# Patient Record
Sex: FEMALE | Race: WHITE | NOT HISPANIC OR LATINO | Employment: OTHER | URBAN - METROPOLITAN AREA
[De-identification: names, ages, dates, MRNs, and addresses within clinical notes are randomized per-mention and may not be internally consistent; named-entity substitution may affect disease eponyms.]

---

## 2023-09-13 LAB
ALANINE AMINOTRANSFERASE (SGPT) (U/L) IN SER/PLAS: 63 U/L (ref 7–45)
ALBUMIN (G/DL) IN SER/PLAS: 4.6 G/DL (ref 3.4–5)
ALKALINE PHOSPHATASE (U/L) IN SER/PLAS: 39 U/L (ref 33–136)
ANION GAP IN SER/PLAS: 15 MMOL/L (ref 10–20)
ASPARTATE AMINOTRANSFERASE (SGOT) (U/L) IN SER/PLAS: 39 U/L (ref 9–39)
BASOPHILS (10*3/UL) IN BLOOD BY AUTOMATED COUNT: 0.05 X10E9/L (ref 0–0.1)
BASOPHILS/100 LEUKOCYTES IN BLOOD BY AUTOMATED COUNT: 0.9 % (ref 0–2)
BILIRUBIN TOTAL (MG/DL) IN SER/PLAS: 0.3 MG/DL (ref 0–1.2)
CALCIUM (MG/DL) IN SER/PLAS: 10.5 MG/DL (ref 8.6–10.6)
CARBON DIOXIDE, TOTAL (MMOL/L) IN SER/PLAS: 26 MMOL/L (ref 21–32)
CHLORIDE (MMOL/L) IN SER/PLAS: 102 MMOL/L (ref 98–107)
COBALAMIN (VITAMIN B12) (PG/ML) IN SER/PLAS: 312 PG/ML (ref 211–911)
CREATININE (MG/DL) IN SER/PLAS: 0.7 MG/DL (ref 0.5–1.05)
EOSINOPHILS (10*3/UL) IN BLOOD BY AUTOMATED COUNT: 0.15 X10E9/L (ref 0–0.7)
EOSINOPHILS/100 LEUKOCYTES IN BLOOD BY AUTOMATED COUNT: 2.7 % (ref 0–6)
ERYTHROCYTE DISTRIBUTION WIDTH (RATIO) BY AUTOMATED COUNT: 13.4 % (ref 11.5–14.5)
ERYTHROCYTE MEAN CORPUSCULAR HEMOGLOBIN CONCENTRATION (G/DL) BY AUTOMATED: 31.6 G/DL (ref 32–36)
ERYTHROCYTE MEAN CORPUSCULAR VOLUME (FL) BY AUTOMATED COUNT: 88 FL (ref 80–100)
ERYTHROCYTES (10*6/UL) IN BLOOD BY AUTOMATED COUNT: 4.87 X10E12/L (ref 4–5.2)
FERRITIN (UG/LL) IN SER/PLAS: 31 UG/L (ref 8–150)
GFR FEMALE: >90 ML/MIN/1.73M2
GLUCOSE (MG/DL) IN SER/PLAS: 95 MG/DL (ref 74–99)
HEMATOCRIT (%) IN BLOOD BY AUTOMATED COUNT: 42.7 % (ref 36–46)
HEMOGLOBIN (G/DL) IN BLOOD: 13.5 G/DL (ref 12–16)
IMMATURE GRANULOCYTES/100 LEUKOCYTES IN BLOOD BY AUTOMATED COUNT: 0.7 % (ref 0–0.9)
IRON (UG/DL) IN SER/PLAS: 89 UG/DL (ref 35–150)
IRON BINDING CAPACITY (UG/DL) IN SER/PLAS: >539 UG/DL (ref 240–445)
IRON SATURATION (%) IN SER/PLAS: ABNORMAL % (ref 25–45)
LEUKOCYTES (10*3/UL) IN BLOOD BY AUTOMATED COUNT: 5.6 X10E9/L (ref 4.4–11.3)
LYMPHOCYTES (10*3/UL) IN BLOOD BY AUTOMATED COUNT: 1.67 X10E9/L (ref 1.2–4.8)
LYMPHOCYTES/100 LEUKOCYTES IN BLOOD BY AUTOMATED COUNT: 29.8 % (ref 13–44)
MONOCYTES (10*3/UL) IN BLOOD BY AUTOMATED COUNT: 0.5 X10E9/L (ref 0.1–1)
MONOCYTES/100 LEUKOCYTES IN BLOOD BY AUTOMATED COUNT: 8.9 % (ref 2–10)
NEUTROPHILS (10*3/UL) IN BLOOD BY AUTOMATED COUNT: 3.2 X10E9/L (ref 1.2–7.7)
NEUTROPHILS/100 LEUKOCYTES IN BLOOD BY AUTOMATED COUNT: 57 % (ref 40–80)
NRBC (PER 100 WBCS) BY AUTOMATED COUNT: 0 /100 WBC (ref 0–0)
PLATELETS (10*3/UL) IN BLOOD AUTOMATED COUNT: 353 X10E9/L (ref 150–450)
POTASSIUM (MMOL/L) IN SER/PLAS: 4 MMOL/L (ref 3.5–5.3)
PROTEIN TOTAL: 7.3 G/DL (ref 6.4–8.2)
SODIUM (MMOL/L) IN SER/PLAS: 139 MMOL/L (ref 136–145)
UREA NITROGEN (MG/DL) IN SER/PLAS: 18 MG/DL (ref 6–23)

## 2023-09-22 LAB — CHROMOGRANIN A, LC/MS/MS: 1867 NG/ML

## 2023-10-09 ENCOUNTER — TELEPHONE (OUTPATIENT)
Dept: HEMATOLOGY/ONCOLOGY | Facility: CLINIC | Age: 63
End: 2023-10-09
Payer: COMMERCIAL

## 2023-10-09 DIAGNOSIS — D3A.092: Primary | ICD-10-CM

## 2023-10-09 PROCEDURE — 83497 ASSAY OF 5-HIAA: CPT

## 2023-10-09 NOTE — TELEPHONE ENCOUNTER
Call returned to Earline at [facility] lab.  Earline received a 24 hour urine sample but no lab requisition.  Asking for a new order placed for the 5-HIAA and FAX the order to her directly at [phone].    Informed Earline that provider is in tomorrow in clinic and will be here to assist with entering this 24 hour urine 5-HIAA.  Earline reports that the collection date of urine was on 10/4/23 and the stability is x 14 days as long as it is kept refrigerated.  Will have provider submit order on 10/10/23.  Message also forwarded to nurse partner Simón Bermudez.       (Previous message retrieved from [facility] written from [employer] on 9/26/23):  Message:    Call returned to patient.  Labs reviewed by phone.  Patient notified of need for 24 hour urine HIAA.  Patient states she has done in the past.  Teaching reiterated.  Call back instructions reviewed.     Patient verbalize understanding.   PI on HIAA included with lab orders.  Patient will  Monday.      Team Communication:  Team Communications:    [employer] Message  Authored By: Nathan Hernandez  Task Type: General Message  Task Importance: Normal  Patient Name: SAMIRA LUX  Date Created: 26-Sep-2023  Please do 24-hour urine 5 HIAA level

## 2023-10-09 NOTE — TELEPHONE ENCOUNTER
Earline from  lab in [facility] called because a 24 hour urine sample was sent to them however they do not have an order from Dr. Hernandez. They need an order sent and if it is sent soon they can still use this sample. If they don't get an order soon they will not be able to use the sample. Please call Earline at [phone]

## 2023-10-09 NOTE — TELEPHONE ENCOUNTER
Order for 5-HIAA was still available in [employer] to be able to print.  Order successfully faxed to [phone].

## 2023-10-09 NOTE — TELEPHONE ENCOUNTER
Spoke to Earline to confirm she received the order from [employer].  Earline stated they cannot use the previous 5-HIAA order from [employer] it needs to be entered as a new order in Epic.  Order forwarded to nurse partner Simón and Dr. Hernandez as  Will be in clinic on 10/10/23.

## 2023-10-10 ENCOUNTER — LAB REQUISITION (OUTPATIENT)
Dept: LAB | Facility: HOSPITAL | Age: 63
End: 2023-10-10
Payer: COMMERCIAL

## 2023-10-10 DIAGNOSIS — D3A.092: ICD-10-CM

## 2023-10-10 NOTE — TELEPHONE ENCOUNTER
Dr. Hernandez placed new order in [facility] for 24 hour Urine for 5HIAA.  Successful fax received to Earline at fax # [phone].  Phone call also made to Earline and she has received the order.

## 2023-10-13 LAB
5OH-INDOLEACETATE 24H UR-MCNC: 4.2 MG/L
5OH-INDOLEACETATE 24H UR-MRATE: 6.7 MG/24 HR (ref 0–14.9)

## 2023-10-16 ENCOUNTER — TELEPHONE (OUTPATIENT)
Dept: HEMATOLOGY/ONCOLOGY | Facility: HOSPITAL | Age: 63
End: 2023-10-16
Payer: COMMERCIAL

## 2023-10-16 NOTE — TELEPHONE ENCOUNTER
Patient was in to see Dr. Hernandez a couple of weeks ago.    She is looking for results on Chromogranin A and 24 hour urine.    Please advise.

## 2023-10-17 ENCOUNTER — TELEPHONE (OUTPATIENT)
Dept: HEMATOLOGY/ONCOLOGY | Facility: CLINIC | Age: 63
End: 2023-10-17
Payer: COMMERCIAL

## 2023-10-17 NOTE — TELEPHONE ENCOUNTER
Spoke with patient regarding lab results. Per MD results were normal/stable and we will continue to monitor.   She is taking her iron pills and has req to have labs drawn in December

## 2024-01-02 ENCOUNTER — LAB (OUTPATIENT)
Dept: LAB | Facility: CLINIC | Age: 64
End: 2024-01-02
Payer: COMMERCIAL

## 2024-01-02 DIAGNOSIS — D3A.8 OTHER BENIGN NEUROENDOCRINE TUMORS (CMS-HCC): Primary | ICD-10-CM

## 2024-01-02 LAB
BASOPHILS # BLD AUTO: 0.04 X10*3/UL (ref 0–0.1)
BASOPHILS NFR BLD AUTO: 0.8 %
EOSINOPHIL # BLD AUTO: 0.14 X10*3/UL (ref 0–0.7)
EOSINOPHIL NFR BLD AUTO: 2.9 %
ERYTHROCYTE [DISTWIDTH] IN BLOOD BY AUTOMATED COUNT: 13.2 % (ref 11.5–14.5)
HCT VFR BLD AUTO: 40.6 % (ref 36–46)
HGB BLD-MCNC: 13 G/DL (ref 12–16)
IMM GRANULOCYTES # BLD AUTO: 0.05 X10*3/UL (ref 0–0.7)
IMM GRANULOCYTES NFR BLD AUTO: 1 % (ref 0–0.9)
LYMPHOCYTES # BLD AUTO: 1.64 X10*3/UL (ref 1.2–4.8)
LYMPHOCYTES NFR BLD AUTO: 34.2 %
MCH RBC QN AUTO: 28.3 PG (ref 26–34)
MCHC RBC AUTO-ENTMCNC: 32 G/DL (ref 32–36)
MCV RBC AUTO: 88 FL (ref 80–100)
MONOCYTES # BLD AUTO: 0.41 X10*3/UL (ref 0.1–1)
MONOCYTES NFR BLD AUTO: 8.5 %
NEUTROPHILS # BLD AUTO: 2.52 X10*3/UL (ref 1.2–7.7)
NEUTROPHILS NFR BLD AUTO: 52.6 %
NRBC BLD-RTO: ABNORMAL /100{WBCS}
PLATELET # BLD AUTO: 379 X10*3/UL (ref 150–450)
RBC # BLD AUTO: 4.6 X10*6/UL (ref 4–5.2)
WBC # BLD AUTO: 4.8 X10*3/UL (ref 4.4–11.3)

## 2024-01-02 PROCEDURE — 85025 COMPLETE CBC W/AUTO DIFF WBC: CPT

## 2024-01-02 PROCEDURE — 82728 ASSAY OF FERRITIN: CPT

## 2024-01-02 PROCEDURE — 83540 ASSAY OF IRON: CPT

## 2024-01-02 PROCEDURE — 36415 COLL VENOUS BLD VENIPUNCTURE: CPT

## 2024-01-03 LAB
FERRITIN SERPL-MCNC: 50 NG/ML (ref 8–150)
IRON SATN MFR SERPL: ABNORMAL %
IRON SERPL-MCNC: 69 UG/DL (ref 35–150)
TIBC SERPL-MCNC: ABNORMAL UG/DL
UIBC SERPL-MCNC: >450 UG/DL (ref 110–370)

## 2024-01-30 ENCOUNTER — TELEPHONE (OUTPATIENT)
Dept: HEMATOLOGY/ONCOLOGY | Facility: CLINIC | Age: 64
End: 2024-01-30
Payer: COMMERCIAL

## 2024-01-30 NOTE — TELEPHONE ENCOUNTER
Patient had labs in January and hasn't heard anything.  Also needs to know if she should continue iron.    Please advise.  If she does not answer, it is ok to leave a message.

## 2024-03-12 ENCOUNTER — APPOINTMENT (OUTPATIENT)
Dept: HEMATOLOGY/ONCOLOGY | Facility: CLINIC | Age: 64
End: 2024-03-12
Payer: COMMERCIAL

## 2024-03-12 PROBLEM — E11.65 POORLY CONTROLLED TYPE 2 DIABETES MELLITUS (MULTI): Status: ACTIVE | Noted: 2022-09-28

## 2024-03-12 PROBLEM — E03.9 ACQUIRED HYPOTHYROIDISM: Status: ACTIVE | Noted: 2022-09-28

## 2024-03-12 PROBLEM — K80.20 GALLSTONE: Status: ACTIVE | Noted: 2023-05-18

## 2024-03-12 PROBLEM — K21.9 GASTRIC REFLUX: Status: ACTIVE | Noted: 2024-03-12

## 2024-03-12 PROBLEM — K29.40 ATROPHIC GASTRITIS: Status: ACTIVE | Noted: 2024-03-12

## 2024-03-12 PROBLEM — E07.9 THYROID DISEASE: Status: ACTIVE | Noted: 2024-03-12

## 2024-03-12 PROBLEM — K31.A0 INTESTINAL METAPLASIA OF GASTRIC MUCOSA: Status: ACTIVE | Noted: 2024-03-12

## 2024-03-12 PROBLEM — D50.9 IDA (IRON DEFICIENCY ANEMIA): Status: ACTIVE | Noted: 2024-03-12

## 2024-03-12 PROBLEM — E78.5 HYPERLIPIDEMIA: Status: ACTIVE | Noted: 2024-03-12

## 2024-03-12 PROBLEM — F41.9 MILD ANXIETY: Status: ACTIVE | Noted: 2024-03-12

## 2024-03-12 PROBLEM — D3A.092 CARCINOID TUMOR OF STOMACH (CMS-HCC): Status: ACTIVE | Noted: 2018-10-29

## 2024-03-12 PROBLEM — D3A.8 NEUROENDOCRINE TUMOR (CMS-HCC): Status: ACTIVE | Noted: 2024-03-12

## 2024-03-12 RX ORDER — GLIPIZIDE 5 MG/1
1 TABLET ORAL 2 TIMES DAILY
COMMUNITY
Start: 2018-10-09

## 2024-03-12 RX ORDER — METFORMIN HYDROCHLORIDE 500 MG/1
TABLET ORAL
COMMUNITY

## 2024-03-12 RX ORDER — LEVOTHYROXINE SODIUM 50 UG/1
TABLET ORAL
COMMUNITY
Start: 2018-06-28

## 2024-03-12 RX ORDER — ASPIRIN 81 MG/1
TABLET ORAL
COMMUNITY
Start: 2020-12-10

## 2024-03-12 RX ORDER — ONDANSETRON 4 MG/1
TABLET, ORALLY DISINTEGRATING ORAL
COMMUNITY
Start: 2023-03-26

## 2024-03-12 RX ORDER — PIOGLITAZONEHYDROCHLORIDE 15 MG/1
15 TABLET ORAL
COMMUNITY
Start: 2023-03-31

## 2024-03-12 RX ORDER — LEVOTHYROXINE SODIUM 50 UG/1
50 CAPSULE ORAL
COMMUNITY
Start: 2018-10-09

## 2024-03-12 RX ORDER — CYANOCOBALAMIN 1000 UG/ML
INJECTION, SOLUTION INTRAMUSCULAR; SUBCUTANEOUS
COMMUNITY
Start: 2018-10-09

## 2024-03-12 RX ORDER — GLIPIZIDE 10 MG/1
TABLET ORAL
COMMUNITY
Start: 2019-04-04

## 2024-03-12 RX ORDER — HYDROCORTISONE AND ACETIC ACID 20.75; 10.375 MG/ML; MG/ML
SOLUTION AURICULAR (OTIC)
COMMUNITY
Start: 2017-09-21

## 2024-03-12 RX ORDER — LOSARTAN POTASSIUM AND HYDROCHLOROTHIAZIDE 25; 100 MG/1; MG/1
TABLET ORAL
COMMUNITY
Start: 2018-10-09

## 2024-03-12 RX ORDER — FENOFIBRATE 134 MG/1
CAPSULE ORAL
COMMUNITY
Start: 2015-04-03

## 2024-03-12 RX ORDER — NITROFURANTOIN 25; 75 MG/1; MG/1
CAPSULE ORAL
COMMUNITY
Start: 2024-01-18

## 2024-03-12 RX ORDER — FLUTICASONE PROPIONATE 50 MCG
SPRAY, SUSPENSION (ML) NASAL
COMMUNITY
Start: 2021-04-05

## 2024-03-12 RX ORDER — FLUCONAZOLE 200 MG/1
TABLET ORAL
COMMUNITY
Start: 2023-12-28

## 2024-03-12 RX ORDER — SIMVASTATIN 5 MG/1
1 TABLET, FILM COATED ORAL DAILY
COMMUNITY

## 2024-03-12 RX ORDER — AMLODIPINE BESYLATE 2.5 MG/1
TABLET ORAL
COMMUNITY
Start: 2018-01-01

## 2024-03-12 RX ORDER — METFORMIN HYDROCHLORIDE 1000 MG/1
1 TABLET ORAL
COMMUNITY
Start: 2023-10-11

## 2024-03-12 RX ORDER — ATORVASTATIN CALCIUM 10 MG/1
TABLET, FILM COATED ORAL
COMMUNITY
Start: 2023-02-16

## 2024-03-12 RX ORDER — CHOLECALCIFEROL (VITAMIN D3) 50 MCG
TABLET ORAL
COMMUNITY

## 2024-03-12 RX ORDER — HYOSCYAMINE SULFATE 0.125 MG
0.12 TABLET ORAL EVERY 8 HOURS PRN
COMMUNITY
Start: 2023-03-26

## 2024-03-26 ENCOUNTER — LAB (OUTPATIENT)
Dept: LAB | Facility: CLINIC | Age: 64
End: 2024-03-26
Payer: COMMERCIAL

## 2024-03-26 ENCOUNTER — OFFICE VISIT (OUTPATIENT)
Dept: HEMATOLOGY/ONCOLOGY | Facility: CLINIC | Age: 64
End: 2024-03-26
Payer: COMMERCIAL

## 2024-03-26 VITALS
DIASTOLIC BLOOD PRESSURE: 84 MMHG | HEART RATE: 75 BPM | SYSTOLIC BLOOD PRESSURE: 137 MMHG | OXYGEN SATURATION: 94 % | BODY MASS INDEX: 35.18 KG/M2 | WEIGHT: 224.65 LBS | TEMPERATURE: 97.2 F | RESPIRATION RATE: 16 BRPM

## 2024-03-26 DIAGNOSIS — D3A.092: ICD-10-CM

## 2024-03-26 DIAGNOSIS — D50.0 IRON DEFICIENCY ANEMIA DUE TO CHRONIC BLOOD LOSS: ICD-10-CM

## 2024-03-26 DIAGNOSIS — K29.40 ATROPHIC GASTRITIS WITHOUT HEMORRHAGE: Primary | ICD-10-CM

## 2024-03-26 DIAGNOSIS — D3A.8 NEUROENDOCRINE TUMOR (CMS-HCC): ICD-10-CM

## 2024-03-26 DIAGNOSIS — D51.0 PERNICIOUS ANEMIA: ICD-10-CM

## 2024-03-26 LAB
BASOPHILS # BLD AUTO: 0.04 X10*3/UL (ref 0–0.1)
BASOPHILS NFR BLD AUTO: 0.9 %
EOSINOPHIL # BLD AUTO: 0.13 X10*3/UL (ref 0–0.7)
EOSINOPHIL NFR BLD AUTO: 2.8 %
ERYTHROCYTE [DISTWIDTH] IN BLOOD BY AUTOMATED COUNT: 13.1 % (ref 11.5–14.5)
HCT VFR BLD AUTO: 41 % (ref 36–46)
HGB BLD-MCNC: 13.6 G/DL (ref 12–16)
IMM GRANULOCYTES # BLD AUTO: 0.02 X10*3/UL (ref 0–0.7)
IMM GRANULOCYTES NFR BLD AUTO: 0.4 % (ref 0–0.9)
LYMPHOCYTES # BLD AUTO: 1.21 X10*3/UL (ref 1.2–4.8)
LYMPHOCYTES NFR BLD AUTO: 25.9 %
MCH RBC QN AUTO: 28.4 PG (ref 26–34)
MCHC RBC AUTO-ENTMCNC: 33.2 G/DL (ref 32–36)
MCV RBC AUTO: 86 FL (ref 80–100)
MONOCYTES # BLD AUTO: 0.38 X10*3/UL (ref 0.1–1)
MONOCYTES NFR BLD AUTO: 8.1 %
NEUTROPHILS # BLD AUTO: 2.9 X10*3/UL (ref 1.2–7.7)
NEUTROPHILS NFR BLD AUTO: 61.9 %
NRBC BLD-RTO: NORMAL /100{WBCS}
PLATELET # BLD AUTO: 310 X10*3/UL (ref 150–450)
RBC # BLD AUTO: 4.79 X10*6/UL (ref 4–5.2)
WBC # BLD AUTO: 4.7 X10*3/UL (ref 4.4–11.3)

## 2024-03-26 PROCEDURE — 80053 COMPREHEN METABOLIC PANEL: CPT | Performed by: INTERNAL MEDICINE

## 2024-03-26 PROCEDURE — 3075F SYST BP GE 130 - 139MM HG: CPT | Performed by: INTERNAL MEDICINE

## 2024-03-26 PROCEDURE — 3079F DIAST BP 80-89 MM HG: CPT | Performed by: INTERNAL MEDICINE

## 2024-03-26 PROCEDURE — 82728 ASSAY OF FERRITIN: CPT | Performed by: INTERNAL MEDICINE

## 2024-03-26 PROCEDURE — 99214 OFFICE O/P EST MOD 30 MIN: CPT | Performed by: INTERNAL MEDICINE

## 2024-03-26 PROCEDURE — 36415 COLL VENOUS BLD VENIPUNCTURE: CPT | Performed by: INTERNAL MEDICINE

## 2024-03-26 PROCEDURE — 82607 VITAMIN B-12: CPT | Performed by: INTERNAL MEDICINE

## 2024-03-26 PROCEDURE — 83540 ASSAY OF IRON: CPT | Performed by: INTERNAL MEDICINE

## 2024-03-26 PROCEDURE — 85025 COMPLETE CBC W/AUTO DIFF WBC: CPT | Performed by: INTERNAL MEDICINE

## 2024-03-26 PROCEDURE — 86316 IMMUNOASSAY TUMOR OTHER: CPT | Performed by: INTERNAL MEDICINE

## 2024-03-26 ASSESSMENT — PAIN SCALES - GENERAL: PAINLEVEL: 0-NO PAIN

## 2024-03-26 NOTE — PROGRESS NOTES
Patient for labs today.  Follow up in 6 months with labs prior. Patient will most likely get at [facility].  Aware labs in computer so no handout needed. Patient independently ambulatory off unit in [facility] and without complaints.  Gait steady.  Call back instructions reviewed.  Patient verbalized understanding.

## 2024-03-26 NOTE — PROGRESS NOTES
Patient ID: Yessica Conner is a 63 y.o. female.  Referring Physician: No referring provider defined for this encounter.  Primary Care Provider: Khris Harper MD    ORDERS & PATIENT INSTRUCTIONS:    Patient Instructions:       CBC CMP  chromogranin A,, serum B12 and iron group and ferritin today    RTC 6 m    same labs    ASSESSMENT, PROBLEM LIST, DECISION MAKING, PLAN.      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  Random gastric biopsy was consistent with atrophic gastritis.  Gastrin level extremely elevated at around 2300 and 24-hour urine for 5 HIAA level normal, chromogranin A is elevated  Patient has a type I gastric carcinoid which is associated with chronic atrophic gastritis, and  related to her pernicious anemia, typically tumor less than 1-2 cm in size can be periodically removed endoscopically especially those are low-grade carcinoid and less likely to metastasize,   Patient was seen by Dr. Oviedo and recommended watchful waiting    She has been on periodic endoscopy and endoscopic removal of gastric polyp, patient has had Pediotic EGD and removal of gastric polyp which was consistent with low-grade neuroendocrine tumors     pernicious anemia diagnosed at age 29  Vitamin B12 deficiency, patient is on vitamin B12 injection once a month's/self injection.      Iron deficiency anemia-patient received IV iron in September 2020 with normalization of CBC    Past medical history  Diabetes mellitus, hypothyroidism, hypertension, hyperlipidemia, GERD, shingles in 2007, anxiety,      Interval history    Patient returns today for follow-up on gastric neuroendocrine tumors, low-grade,   Patient had additional  EGD done and colonoscopy done by Dr. Villarreal on 14 March 2024 and found 6 gastric polyps, but those were not malignant patient also had a colonoscopy at the same time.   Dr. Villarreal recommended EGD once a year  Patient is currently asymptomatic  Currently takes B12 self injection once a month    PHYSICAL EXAM:    Gen:  Conscious,  no acute distress,   HEENT: Normocephalic, no icterus. . No nasal discharge,  Oral mucosa moist  Chest: Bilateral symmetrical, Bilateral AE        CVS: S1S2.   Abdomen: Soft, no guarding or rigidity BS+   Extremities: No C/C   Skin: No petechiae.        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  Random gastric biopsy was consistent with atrophic gastritis.  Gastrin level extremely elevated at around 2300 and 24-hour urine for 5 HIAA level normal, chromogranin A is elevated  Patient has a type I gastric carcinoid which is associated with chronic atrophic gastritis, and  related to her pernicious anemia, typically tumor less than 1-2 cm in size can be periodically removed endoscopically especially those are low-grade carcinoid and less likely to metastasize,   Patient was seen by Dr. Oviedo and recommended watchful waiting    She has been on periodic endoscopy and endoscopic removal of gastric polyp,   Had repeat EGD done in February 2023 and removed additional 14 polyps which were all subcentimeter, and came back positive for low-grade neuroendocrine tumor.    Repeat EGD on March 14, 2024 showed multiple gastric polyps, 6 polyps were resected from the body and the antrum biopsy result was negative for malignancy and H. pylori was negative, colonoscopy had 1 tubular adenoma in the cecum other  "1 was hyperplastic.    Dr. Villarreal is planning repeat EGD once a year per patient  She is getting B12 shots self injection once a month        Pernicious anemia/Vitamin B12 deficiency, patient is on vitamin B12 injection once a month's/self injection.            VITALS:   2.2 meters squared /84   Pulse 75   Temp 36.2 °C (97.2 °F)   Resp 16   Wt 102 kg (224 lb 10.4 oz)   SpO2 94%   BMI 35.18 kg/m²     LABS:    CBC:  Recent Labs     01/02/24  0921 09/12/23  1441   WBC 4.8 5.6   HGB 13.0 13.5   HCT 40.6 42.7    353   MCV 88 88       CMP:  Recent Labs     09/12/23  1441      K 4.0      CO2 26   ANIONGAP 15   BUN 18   CREATININE 0.70     Recent Labs     09/12/23  1441   ALBUMIN 4.6   ALKPHOS 39   ALT 63*   AST 39   BILITOT 0.3       HEME/ENDO:  Recent Labs     01/02/24 0921 09/12/23  1441   FERRITIN 50 31   IRONSAT  --  NOT CALC.   [id number]  --  312        MICRO: No results for input(s): \"ESR\", \"CRP\", \"PROCAL\" in the last 70443 hours.  No results found for the last 90 days.        TUMOR MARKERS:  No results found for: \"LABCA2\", \"CEA\", \"\", \"PSA\", \"AFPTM\", \"HCGTM\", \"\"             IMAGING:         OUTSIDE IMAGING SCAN  Ordered by an unspecified provider.       Current Outpatient Medications   Medication Sig Dispense Refill    amLODIPine (Norvasc) 2.5 mg tablet       aspirin 81 mg EC tablet Take by mouth.      atorvastatin (Lipitor) 10 mg tablet       cholecalciferol (Vitamin D-3) 50 MCG (2000 UT) tablet Take by mouth.      cyanocobalamin (Vitamin B-12) 1,000 mcg/mL injection       fenofibrate micronized (Lofibra) 134 mg capsule       fluticasone (Flonase) 50 mcg/actuation nasal spray       glipiZIDE (Glucotrol) 10 mg tablet       levothyroxine (Synthroid, Levoxyl) 50 mcg tablet       levothyroxine (Tirosint) 50 mcg capsule 1 capsule (50 mcg).      losartan-hydrochlorothiazide (Hyzaar) 100-25 mg tablet       metformin HCl (METFORMIN ORAL) 1,000 mg.      nitrofurantoin, " macrocrystal-monohydrate, (Macrobid) 100 mg capsule TAKE 1 CAPSULE AFTER SEX AND REPEAT 1 CAPSULE IN 12 HOURS ORALLY EVERY 90 DAYS      pioglitazone (Actos) 15 mg tablet Take 1 tablet (15 mg) by mouth once daily.      atorvastatin calcium (ATORVASTATIN ORAL) Take 10 mg by mouth.      fluconazole (Diflucan) 200 mg tablet 1 TABLET ORALLY EVERY OTHER DAY FOR 3 DOSES 7 DAYS      glipiZIDE (Glucotrol) 5 mg tablet Take 1 tablet (5 mg) by mouth 2 times a day.      hydrocortisone-acetic acid (Vosol-HC) otic solution       hyoscyamine (Anaspaz, Levsin) 0.125 mg tablet Take 1 tablet (0.125 mg) by mouth every 8 hours if needed.      metFORMIN (Glucophage) 1,000 mg tablet Take 1 tablet (1,000 mg) by mouth 2 times a day with meals.      metFORMIN (Glucophage) 500 mg tablet Take by mouth.      ondansetron ODT (Zofran-ODT) 4 mg disintegrating tablet TAKE 1 TABLET BY MOUTH EVERY 6 HOURS AS NEEDED FOR NAUSEA AND VOMITING FOR UP TO 7 DAYS      simvastatin (Zocor) 5 mg tablet Take 1 tablet (5 mg) by mouth once daily.      SITagliptin phosphate (Januvia) 100 mg tablet Take 1 tablet (100 mg) by mouth once daily.       No current facility-administered medications for this visit.                  SOCIAL HISTORY:    has no history on file for alcohol use.   has no history on file for drug use.,   Tobacco Use: Not on file       FAMILY HISTORY:  No family history on file.    ALLERGY:   Sulfamethoxazole-trimethoprim, Canagliflozin, and Sulfa (sulfonamide antibiotics)              Medication reviewed in e-chart  Patient is monitored for medication toxicity  labs reviewed and interpreted independently, X rays independently reviewed  Notes from other physicians involved in care were reviewed    Charting was completed using voice recognition technology and may include unintended errors.    BRANDT GARY MD, PAKO.    Jose David Quick Master Clinician in Hematology and Oncology  Medical Director, [facility] at  [facility].  [place] office  Phone [phone]  Fax      [phone]  [facility] /[place].  Phone [phone]  Fax     [phone]

## 2024-03-27 ENCOUNTER — TELEPHONE (OUTPATIENT)
Dept: HEMATOLOGY/ONCOLOGY | Facility: CLINIC | Age: 64
End: 2024-03-27
Payer: COMMERCIAL

## 2024-03-27 LAB
ALBUMIN SERPL BCP-MCNC: 4.5 G/DL (ref 3.4–5)
ALP SERPL-CCNC: 37 U/L (ref 33–136)
ALT SERPL W P-5'-P-CCNC: 74 U/L (ref 7–45)
ANION GAP SERPL CALC-SCNC: 16 MMOL/L (ref 10–20)
AST SERPL W P-5'-P-CCNC: 44 U/L (ref 9–39)
BILIRUB SERPL-MCNC: 0.4 MG/DL (ref 0–1.2)
BUN SERPL-MCNC: 16 MG/DL (ref 6–23)
CALCIUM SERPL-MCNC: 10.7 MG/DL (ref 8.6–10.6)
CHLORIDE SERPL-SCNC: 102 MMOL/L (ref 98–107)
CO2 SERPL-SCNC: 22 MMOL/L (ref 21–32)
CREAT SERPL-MCNC: 0.7 MG/DL (ref 0.5–1.05)
EGFRCR SERPLBLD CKD-EPI 2021: >90 ML/MIN/1.73M*2
FERRITIN SERPL-MCNC: 28 NG/ML (ref 8–150)
GLUCOSE SERPL-MCNC: 197 MG/DL (ref 74–99)
IRON SATN MFR SERPL: ABNORMAL %
IRON SERPL-MCNC: 91 UG/DL (ref 35–150)
POTASSIUM SERPL-SCNC: 4.1 MMOL/L (ref 3.5–5.3)
PROT SERPL-MCNC: 7 G/DL (ref 6.4–8.2)
SODIUM SERPL-SCNC: 136 MMOL/L (ref 136–145)
TIBC SERPL-MCNC: ABNORMAL UG/DL
UIBC SERPL-MCNC: >450 UG/DL (ref 110–370)
VIT B12 SERPL-MCNC: 584 PG/ML (ref 211–911)

## 2024-04-02 ENCOUNTER — TELEPHONE (OUTPATIENT)
Dept: HEMATOLOGY/ONCOLOGY | Facility: CLINIC | Age: 64
End: 2024-04-02
Payer: COMMERCIAL

## 2024-04-02 NOTE — TELEPHONE ENCOUNTER
Per Dr. Hernandez,  Please advised to take Ferrex 150 mg p.o. Monday Wednesday Friday/over-the-counter.    Left voicemail message with results and instructions on getting Ferrex from pharmacies.

## 2024-04-05 LAB — CHROMOGRANIN A, LC/MS/MS: 1642 NG/ML

## 2024-04-09 ENCOUNTER — TELEPHONE (OUTPATIENT)
Dept: HEMATOLOGY/ONCOLOGY | Facility: CLINIC | Age: 64
End: 2024-04-09
Payer: COMMERCIAL

## 2024-04-09 NOTE — TELEPHONE ENCOUNTER
Per Dr. Hernandez,  Please inform that above test result is (chromogranin A) labs are stable, drink plenty of fluids     Left voicemail message with results.

## 2024-07-25 ENCOUNTER — APPOINTMENT (OUTPATIENT)
Dept: SURGERY | Facility: CLINIC | Age: 64
End: 2024-07-25
Payer: COMMERCIAL

## 2024-07-25 DIAGNOSIS — R10.11 COLICKY RIGHT UPPER QUADRANT PAIN: ICD-10-CM

## 2024-07-25 DIAGNOSIS — K80.20 CALCULUS OF GALLBLADDER WITHOUT CHOLECYSTITIS WITHOUT OBSTRUCTION: Primary | ICD-10-CM

## 2024-07-25 DIAGNOSIS — K29.60 REFLUX GASTRITIS: ICD-10-CM

## 2024-07-25 PROCEDURE — 99203 OFFICE O/P NEW LOW 30 MIN: CPT | Performed by: PHYSICIAN ASSISTANT

## 2024-07-25 NOTE — PROGRESS NOTES
Subjective   Patient ID: Yessica Conner is a 63 y.o. female who presents for New Patient Visit and Cholelithiasis.    HPI  This is a 63-year-old female seen here couple years ago with gallstones elected not to have her gallbladder out.  She is now because she said another gallbladder attack she would like to have her gallbladder removed.  She gets colicky right upper quadrant pain and reflux.  Of note patient has a history of gastric neuroendocrine tumors.  Dr. Villarreal does endoscopy yearly and removes them       Review of Systems  Review of systems is negative other than what is mentioned above          Physical Exam  Eyes: Conjunctiva non -icteric and eye lids are without obvious rash or drooping. Pupils are symmetric.   Ears, Nose, Mouth, and Throat: External ears and nose appear to be without deformity or rash. No lesions or masses noted. Hearing is grossly intact.   Neck:. No JVD noted, tracheal position is midline. No thyromegaly, no thyroid nodules  Head and Face: Examination of the head and face revealed no abnormalities.   Respiratory: No gasping or shortness of breath noted, no use of accessory muscles noted.  Lungs are clear to auscultate  Cardiovascular: Examination for edema is normal, regular rate and rhythm S1-S2  GI: Abdomen non tender to palpation, bowel sounds are present  Skin: No rashes or open lesions/ulcers identified on skin.   Musk: Digits/nails show no clubbing or cyanosis. No asymmetry or masses noted of the musculature. Examination of the muscles/joints/bones show normal range of motion. Gait is grossly normally.   Neurologic: Cranial nerves II- XII intact, motor strength 5/5 muscle strength of the lower extremities bilaterally and equal.    Objective     No diagnosis found.   Patient Active Problem List   Diagnosis    Acquired hypothyroidism    Atrophic gastritis    Carcinoid tumor of stomach (CMS-HCC)    Diabetes mellitus (Multi)    Poorly controlled type 2 diabetes mellitus (Multi)     Thyroid disease    Essential hypertension    Gallstone    Gastric reflux    Hyperlipidemia    EVELIA (iron deficiency anemia)    Intestinal metaplasia of gastric mucosa    Mild anxiety    Neuroendocrine tumor (CMS-HCC)    Obesity with body mass index 30 or greater    Pernicious anemia    Pure hyperglyceridemia      Allergies   Allergen Reactions    Sulfamethoxazole-Trimethoprim Swelling    Canagliflozin Other    Sulfa (Sulfonamide Antibiotics) Other and Swelling      Medication Documentation Review Audit       Reviewed by Elissa Elizabeth MA (Medical Assistant) on 07/25/24 at 1057      Medication Order Taking? Sig Documenting Provider Last Dose Status   amLODIPine (Norvasc) 2.5 mg tablet [id number] No  Historical Provider, MD Taking Active   aspirin 81 mg EC tablet [id number] No Take by mouth. Historical Provider, MD Taking Active   atorvastatin (Lipitor) 10 mg tablet [id number] No  Historical Provider, MD Taking Active   atorvastatin calcium (ATORVASTATIN ORAL) [id number] No Take 10 mg by mouth. Historical Provider, MD Not Taking Active   cholecalciferol (Vitamin D-3) 50 MCG (2000 UT) tablet [id number] No Take by mouth. Historical Provider, MD Taking Active   cyanocobalamin (Vitamin B-12) 1,000 mcg/mL injection [id number] No  Historical Provider, MD Taking Active   fenofibrate micronized (Lofibra) 134 mg capsule [id number] No  Historical Provider, MD Taking Active   fluconazole (Diflucan) 200 mg tablet [id number] No 1 TABLET ORALLY EVERY OTHER DAY FOR 3 DOSES 7 DAYS Historical Provider, MD Not Taking Active   fluticasone (Flonase) 50 mcg/actuation nasal spray [id number] No  Historical Provider, MD Taking Active   glipiZIDE (Glucotrol) 10 mg tablet [id number] No  Historical Provider, MD Taking Active   glipiZIDE (Glucotrol) 5 mg tablet [id number] No Take 1 tablet (5 mg) by mouth 2 times a day. Historical Provider, MD Not Taking Active   hydrocortisone-acetic acid (Vosol-HC) otic solution [id number] No  Historical Provider, MD Not  Taking Active   hyoscyamine (Anaspaz, Levsin) 0.125 mg tablet [id number] No Take 1 tablet (0.125 mg) by mouth every 8 hours if needed. Historical Provider, MD Not Taking Active   levothyroxine (Synthroid, Levoxyl) 50 mcg tablet [id number] No  Historical Provider, MD Taking Active   levothyroxine (Tirosint) 50 mcg capsule [id number] No 1 capsule (50 mcg). Historical Provider, MD Taking Active   losartan-hydrochlorothiazide (Hyzaar) 100-25 mg tablet [id number] No  Historical Provider, MD Taking Active   metFORMIN (Glucophage) 1,000 mg tablet [id number] No Take 1 tablet (1,000 mg) by mouth 2 times a day with meals. Historical Provider, MD Not Taking Active   metFORMIN (Glucophage) 500 mg tablet [id number] No Take by mouth. Historical MD Tata Not Taking Active   metformin HCl (METFORMIN ORAL) [id number] No 1,000 mg. Historical MD Tata Taking Active   nitrofurantoin, macrocrystal-monohydrate, (Macrobid) 100 mg capsule [id number] No TAKE 1 CAPSULE AFTER SEX AND REPEAT 1 CAPSULE IN 12 HOURS ORALLY EVERY 90 DAYS Historical MD Tata Taking Active   ondansetron ODT (Zofran-ODT) 4 mg disintegrating tablet [id number] No TAKE 1 TABLET BY MOUTH EVERY 6 HOURS AS NEEDED FOR NAUSEA AND VOMITING FOR UP TO 7 DAYS Historical MD Tata Not Taking Active   pioglitazone (Actos) 15 mg tablet [id number] No Take 1 tablet (15 mg) by mouth once daily. Historical MD Tata Taking Active   simvastatin (Zocor) 5 mg tablet [id number] No Take 1 tablet (5 mg) by mouth once daily. Historical MD Tata Not Taking Active   SITagliptin phosphate (Januvia) 100 mg tablet [id number] No Take 1 tablet (100 mg) by mouth once daily. Historical Provider, MD Not Taking Active                    Past Medical History:   Diagnosis Date    Diabetes mellitus (Multi)     Stomach cancer (Multi) Nov 2018     Social History     Tobacco Use   Smoking Status Former    Current packs/day: 0.00    Average packs/day: 1 pack/day for 15.0 years (15.0 ttl pk-yrs)     Types: Cigarettes    Start date: 2/1/1994    Quit date: 2/1/2009    Years since quitting: 15.4   Smokeless Tobacco Not on file   Tobacco Comments    quit smoking in 2009     Family History   Problem Relation Name Age of Onset    Diabetes Mother Monique Fry     Hypertension Father Branden Fry       Past Surgical History:   Procedure Laterality Date    ESOPHAGOGASTRODUODENOSCOPY  Oct 2018 & yearly       Assessment/Plan   Today we had a discussion about laparoscopic cholecystectomy, possible open .  Patient was informed that this is an outpatient surgery.  That surgery requires an hour and a half.  The procedure will be  done through 4 small incisions or possible opened procedure .   General anesthesia is required.  Patient was also instructed that if during surgery there were stones found within her ducts that they would require an overnight stay in the hospital and a gastroenterology consult and possible ERCP with stent placement or removal of duct stones.  Patient will need a ride to and from the hospital.  Risk and benefits such as bleeding and infection were discussed.  Alternative treatment was discussed and explained.  All questions were answered.  Patient would like to proceed.    Encounter Diagnoses   Name Primary?    Calculus of gallbladder without cholecystitis without obstruction Yes    Colicky right upper quadrant pain     Reflux gastritis      I have reviewed all data including labs,radiologic and previous reports.      **Portions of this medical record have been created using voice recognition software and may have minor errors which are inherent in voice recognition systems. It has not been fully edited for typographical or grammatical errors**        f/u w/PCP

## 2024-08-13 LAB
NON-UH HIE POC GLUCOSE: 170 MG/DL (ref 72–100)
NON-UH HIE POC GLUCOSE: 184 MG/DL (ref 72–100)

## 2024-08-27 ENCOUNTER — APPOINTMENT (OUTPATIENT)
Dept: SURGERY | Facility: CLINIC | Age: 64
End: 2024-08-27
Payer: COMMERCIAL

## 2024-08-27 DIAGNOSIS — Z90.49 STATUS POST LAPAROSCOPIC CHOLECYSTECTOMY: Primary | ICD-10-CM

## 2024-08-27 PROCEDURE — 99024 POSTOP FOLLOW-UP VISIT: CPT | Performed by: PHYSICIAN ASSISTANT

## 2024-08-27 NOTE — PROGRESS NOTES
Subjective   Patient ID: Yessica Conner is a 63 y.o. female who presents for postcholecystectomy 2 weeks ago    HPI  This is a 63-year-old female is 2 weeks status post laparoscopic cholecystectomy.  Patient tells me a week after surgery she got short of breath she was told go to the emergency room she did she was worked up and negative for PE was then discharged.  Since then she been doing well she is eating she is drinking no nausea no vomiting no diarrhea.    Review of Systems    Review of systems is negative other than what is mentioned above.      Physical Exam  Incisions are clean dry and intact no erythema no swelling no drainage.  Abdomen is soft.    Objective     No diagnosis found.   Patient Active Problem List   Diagnosis    Acquired hypothyroidism    Atrophic gastritis    Carcinoid tumor of stomach (CMS-HCC)    Diabetes mellitus (Multi)    Poorly controlled type 2 diabetes mellitus (Multi)    Thyroid disease    Essential hypertension    Gallstone    Gastric reflux    Hyperlipidemia    EVELIA (iron deficiency anemia)    Intestinal metaplasia of gastric mucosa    Mild anxiety    Neuroendocrine tumor (CMS-HCC)    Obesity with body mass index 30 or greater    Pernicious anemia    Pure hyperglyceridemia      Allergies   Allergen Reactions    Sulfamethoxazole-Trimethoprim Swelling    Canagliflozin Other    Sulfa (Sulfonamide Antibiotics) Other and Swelling      Medication Documentation Review Audit       Reviewed by Elissa Elizabeth MA (Medical Assistant) on 07/25/24 at 1057      Medication Order Taking? Sig Documenting Provider Last Dose Status   amLODIPine (Norvasc) 2.5 mg tablet [id number] No  Historical Provider, MD Taking Active   aspirin 81 mg EC tablet [id number] No Take by mouth. Historical Provider, MD Taking Active   atorvastatin (Lipitor) 10 mg tablet [id number] No  Historical Provider, MD Taking Active   atorvastatin calcium (ATORVASTATIN ORAL) [id number] No Take 10 mg by mouth. Historical Provider, MD Not  Taking Active   cholecalciferol (Vitamin D-3) 50 MCG (2000 UT) tablet [id number] No Take by mouth. Historical Provider, MD Taking Active   cyanocobalamin (Vitamin B-12) 1,000 mcg/mL injection [id number] No  Historical Provider, MD Taking Active   fenofibrate micronized (Lofibra) 134 mg capsule [id number] No  Historical Provider, MD Taking Active   fluconazole (Diflucan) 200 mg tablet [id number] No 1 TABLET ORALLY EVERY OTHER DAY FOR 3 DOSES 7 DAYS Historical Provider, MD Not Taking Active   fluticasone (Flonase) 50 mcg/actuation nasal spray [id number] No  Historical Provider, MD Taking Active   glipiZIDE (Glucotrol) 10 mg tablet [id number] No  Historical Provider, MD Taking Active   glipiZIDE (Glucotrol) 5 mg tablet [id number] No Take 1 tablet (5 mg) by mouth 2 times a day. Historical Provider, MD Not Taking Active   hydrocortisone-acetic acid (Vosol-HC) otic solution [id number] No  Historical Provider, MD Not Taking Active   hyoscyamine (Anaspaz, Levsin) 0.125 mg tablet [id number] No Take 1 tablet (0.125 mg) by mouth every 8 hours if needed. Historical Provider, MD Not Taking Active   levothyroxine (Synthroid, Levoxyl) 50 mcg tablet [id number] No  Historical Provider, MD Taking Active   levothyroxine (Tirosint) 50 mcg capsule [id number] No 1 capsule (50 mcg). Historical Provider, MD Taking Active   losartan-hydrochlorothiazide (Hyzaar) 100-25 mg tablet [id number] No  Historical Provider, MD Taking Active   metFORMIN (Glucophage) 1,000 mg tablet [id number] No Take 1 tablet (1,000 mg) by mouth 2 times a day with meals. Historical Provider, MD Not Taking Active   metFORMIN (Glucophage) 500 mg tablet [id number] No Take by mouth. Historical Provider, MD Not Taking Active   metformin HCl (METFORMIN ORAL) [id number] No 1,000 mg. Historical Provider, MD Taking Active   nitrofurantoin, macrocrystal-monohydrate, (Macrobid) 100 mg capsule [id number] No TAKE 1 CAPSULE AFTER SEX AND REPEAT 1 CAPSULE IN 12 HOURS ORALLY EVERY 90 DAYS Historical  Provider, MD Taking Active   ondansetron ODT (Zofran-ODT) 4 mg disintegrating tablet [id number] No TAKE 1 TABLET BY MOUTH EVERY 6 HOURS AS NEEDED FOR NAUSEA AND VOMITING FOR UP TO 7 DAYS Historical Provider, MD Not Taking Active   pioglitazone (Actos) 15 mg tablet [id number] No Take 1 tablet (15 mg) by mouth once daily. Historical Provider, MD Taking Active   simvastatin (Zocor) 5 mg tablet [id number] No Take 1 tablet (5 mg) by mouth once daily. Historical Provider, MD Not Taking Active   SITagliptin phosphate (Januvia) 100 mg tablet [id number] No Take 1 tablet (100 mg) by mouth once daily. Historical Provider, MD Not Taking Active                    Past Medical History:   Diagnosis Date    Diabetes mellitus (Multi)     Stomach cancer (Multi) Nov 2018     Social History     Tobacco Use   Smoking Status Former    Current packs/day: 0.00    Average packs/day: 1 pack/day for 15.0 years (15.0 ttl pk-yrs)    Types: Cigarettes    Start date: 2/1/1994    Quit date: 2/1/2009    Years since quitting: 15.5   Smokeless Tobacco Not on file   Tobacco Comments    quit smoking in 2009     Family History   Problem Relation Name Age of Onset    Diabetes Mother Monique Fry     Hypertension Father Branden Fry       Past Surgical History:   Procedure Laterality Date    ESOPHAGOGASTRODUODENOSCOPY  Oct 2018 & yearly       Assessment/Plan   No lifting over 10 to 12 pounds for 4 weeks  No swimming pools hot tubs or lakes for 2 weeks  You may ride a stationary bike, you may use a treadmill, you may walk outside.  No squats, sit ups or lunges or core exercises for 4 weeks   You may drive a car  Follow-up as needed    No diagnosis found.  Encounter Diagnosis   Name Primary?    Status post laparoscopic cholecystectomy Yes     I have reviewed all data including labs,radiologic and previous reports.     **Portions of this medical record have been created using voice recognition software and may have minor errors which we are inherent in voice  recognition systems it has not been fully edited for typographical or grammatical errors**      Lisa Juarez PA-C

## 2024-09-18 ENCOUNTER — LAB (OUTPATIENT)
Dept: LAB | Facility: LAB | Age: 64
End: 2024-09-18
Payer: COMMERCIAL

## 2024-09-18 DIAGNOSIS — K29.40 ATROPHIC GASTRITIS WITHOUT HEMORRHAGE: ICD-10-CM

## 2024-09-18 DIAGNOSIS — D3A.092 BENIGN CARCINOID TUMOR OF STOMACH: ICD-10-CM

## 2024-09-18 DIAGNOSIS — D3A.8 NEUROENDOCRINE TUMOR: ICD-10-CM

## 2024-09-18 DIAGNOSIS — D51.0 PERNICIOUS ANEMIA: ICD-10-CM

## 2024-09-18 DIAGNOSIS — D50.0 IRON DEFICIENCY ANEMIA DUE TO CHRONIC BLOOD LOSS: ICD-10-CM

## 2024-09-18 LAB
ALBUMIN SERPL BCP-MCNC: 4.4 G/DL (ref 3.4–5)
ALP SERPL-CCNC: 33 U/L (ref 33–136)
ALT SERPL W P-5'-P-CCNC: 39 U/L (ref 7–45)
ANION GAP SERPL CALC-SCNC: 13 MMOL/L (ref 10–20)
AST SERPL W P-5'-P-CCNC: 28 U/L (ref 9–39)
BASOPHILS # BLD AUTO: 0.05 X10*3/UL (ref 0–0.1)
BASOPHILS NFR BLD AUTO: 1 %
BILIRUB SERPL-MCNC: 0.4 MG/DL (ref 0–1.2)
BUN SERPL-MCNC: 15 MG/DL (ref 6–23)
CALCIUM SERPL-MCNC: 9.5 MG/DL (ref 8.6–10.6)
CHLORIDE SERPL-SCNC: 105 MMOL/L (ref 98–107)
CO2 SERPL-SCNC: 26 MMOL/L (ref 21–32)
CREAT SERPL-MCNC: 0.63 MG/DL (ref 0.5–1.05)
EGFRCR SERPLBLD CKD-EPI 2021: >90 ML/MIN/1.73M*2
EOSINOPHIL # BLD AUTO: 0.07 X10*3/UL (ref 0–0.7)
EOSINOPHIL NFR BLD AUTO: 1.4 %
ERYTHROCYTE [DISTWIDTH] IN BLOOD BY AUTOMATED COUNT: 13.9 % (ref 11.5–14.5)
FERRITIN SERPL-MCNC: 18 NG/ML (ref 8–150)
GLUCOSE SERPL-MCNC: 153 MG/DL (ref 74–99)
HCT VFR BLD AUTO: 39.7 % (ref 36–46)
HGB BLD-MCNC: 12.8 G/DL (ref 12–16)
IMM GRANULOCYTES # BLD AUTO: 0.04 X10*3/UL (ref 0–0.7)
IMM GRANULOCYTES NFR BLD AUTO: 0.8 % (ref 0–0.9)
IRON SATN MFR SERPL: ABNORMAL %
IRON SERPL-MCNC: 56 UG/DL (ref 35–150)
LYMPHOCYTES # BLD AUTO: 1.34 X10*3/UL (ref 1.2–4.8)
LYMPHOCYTES NFR BLD AUTO: 26 %
MCH RBC QN AUTO: 27.3 PG (ref 26–34)
MCHC RBC AUTO-ENTMCNC: 32.2 G/DL (ref 32–36)
MCV RBC AUTO: 85 FL (ref 80–100)
MONOCYTES # BLD AUTO: 0.5 X10*3/UL (ref 0.1–1)
MONOCYTES NFR BLD AUTO: 9.7 %
NEUTROPHILS # BLD AUTO: 3.15 X10*3/UL (ref 1.2–7.7)
NEUTROPHILS NFR BLD AUTO: 61.1 %
NRBC BLD-RTO: 0 /100 WBCS (ref 0–0)
PLATELET # BLD AUTO: 305 X10*3/UL (ref 150–450)
POTASSIUM SERPL-SCNC: 4.3 MMOL/L (ref 3.5–5.3)
PROT SERPL-MCNC: 6.7 G/DL (ref 6.4–8.2)
RBC # BLD AUTO: 4.69 X10*6/UL (ref 4–5.2)
SODIUM SERPL-SCNC: 140 MMOL/L (ref 136–145)
TIBC SERPL-MCNC: ABNORMAL UG/DL
UIBC SERPL-MCNC: >450 UG/DL (ref 110–370)
VIT B12 SERPL-MCNC: 270 PG/ML (ref 211–911)
WBC # BLD AUTO: 5.2 X10*3/UL (ref 4.4–11.3)

## 2024-09-18 PROCEDURE — 85025 COMPLETE CBC W/AUTO DIFF WBC: CPT

## 2024-09-18 PROCEDURE — 86316 IMMUNOASSAY TUMOR OTHER: CPT

## 2024-09-18 PROCEDURE — 82607 VITAMIN B-12: CPT

## 2024-09-18 PROCEDURE — 36415 COLL VENOUS BLD VENIPUNCTURE: CPT

## 2024-09-18 PROCEDURE — 80053 COMPREHEN METABOLIC PANEL: CPT

## 2024-09-18 PROCEDURE — 82728 ASSAY OF FERRITIN: CPT

## 2024-09-18 PROCEDURE — 83540 ASSAY OF IRON: CPT

## 2024-09-18 PROCEDURE — 83550 IRON BINDING TEST: CPT

## 2024-09-24 ENCOUNTER — OFFICE VISIT (OUTPATIENT)
Dept: HEMATOLOGY/ONCOLOGY | Facility: CLINIC | Age: 64
End: 2024-09-24
Payer: COMMERCIAL

## 2024-09-24 VITALS
SYSTOLIC BLOOD PRESSURE: 113 MMHG | TEMPERATURE: 96.6 F | HEART RATE: 76 BPM | OXYGEN SATURATION: 93 % | RESPIRATION RATE: 14 BRPM | DIASTOLIC BLOOD PRESSURE: 72 MMHG | BODY MASS INDEX: 33.77 KG/M2 | WEIGHT: 215.61 LBS

## 2024-09-24 DIAGNOSIS — D50.0 IRON DEFICIENCY ANEMIA DUE TO CHRONIC BLOOD LOSS: Primary | ICD-10-CM

## 2024-09-24 DIAGNOSIS — D51.0 PERNICIOUS ANEMIA: ICD-10-CM

## 2024-09-24 DIAGNOSIS — D3A.092 BENIGN CARCINOID TUMOR OF STOMACH: ICD-10-CM

## 2024-09-24 DIAGNOSIS — D3A.8 NEUROENDOCRINE TUMOR: ICD-10-CM

## 2024-09-24 DIAGNOSIS — K29.40 ATROPHIC GASTRITIS WITHOUT HEMORRHAGE: ICD-10-CM

## 2024-09-24 PROCEDURE — 3074F SYST BP LT 130 MM HG: CPT | Performed by: INTERNAL MEDICINE

## 2024-09-24 PROCEDURE — G2211 COMPLEX E/M VISIT ADD ON: HCPCS | Performed by: INTERNAL MEDICINE

## 2024-09-24 PROCEDURE — 99214 OFFICE O/P EST MOD 30 MIN: CPT | Performed by: INTERNAL MEDICINE

## 2024-09-24 PROCEDURE — 3078F DIAST BP <80 MM HG: CPT | Performed by: INTERNAL MEDICINE

## 2024-09-24 RX ORDER — HEPARIN SODIUM,PORCINE/PF 10 UNIT/ML
50 SYRINGE (ML) INTRAVENOUS AS NEEDED
OUTPATIENT
Start: 2024-09-24

## 2024-09-24 RX ORDER — HEPARIN 100 UNIT/ML
500 SYRINGE INTRAVENOUS AS NEEDED
OUTPATIENT
Start: 2024-09-24

## 2024-09-24 RX ORDER — ALBUTEROL SULFATE 0.83 MG/ML
3 SOLUTION RESPIRATORY (INHALATION) AS NEEDED
OUTPATIENT
Start: 2024-10-01

## 2024-09-24 RX ORDER — FAMOTIDINE 10 MG/ML
20 INJECTION INTRAVENOUS ONCE AS NEEDED
OUTPATIENT
Start: 2024-10-01

## 2024-09-24 RX ORDER — EPINEPHRINE 0.3 MG/.3ML
0.3 INJECTION SUBCUTANEOUS EVERY 5 MIN PRN
OUTPATIENT
Start: 2024-10-01

## 2024-09-24 RX ORDER — DIPHENHYDRAMINE HYDROCHLORIDE 50 MG/ML
50 INJECTION INTRAMUSCULAR; INTRAVENOUS AS NEEDED
OUTPATIENT
Start: 2024-10-01

## 2024-09-24 ASSESSMENT — PAIN SCALES - GENERAL: PAINLEVEL: 0-NO PAIN

## 2024-09-24 NOTE — PATIENT INSTRUCTIONS
Patient Instructions:   IV  Feraheme x 1   Discontinue oral iron after that     RTC 4 m        CBC CMP  chromogranin A,, serum B12 and iron group and ferritin

## 2024-09-24 NOTE — PROGRESS NOTES
Patient ID: Yessica Conner is a 63 y.o. female.  Referring Physician: Nathan Hernandez MD  [address] [facility] [address], [address]  [place] [address]  Primary Care Provider: Khris Harper MD    ORDERS & PATIENT INSTRUCTIONS:    Patient Instructions:         IV  Feraheme x 1   Discontinue oral iron after that    RTC 4 m      CBC CMP  chromogranin A,, serum B12 and iron group and ferritin         ASSESSMENT, PROBLEM LIST, DECISION MAKING, PLAN.      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  Random gastric biopsy was consistent with atrophic gastritis.  Gastrin level extremely elevated at around 2300 and 24-hour urine for 5 HIAA level normal, chromogranin A is elevated  Patient has a type I gastric carcinoid which is associated with chronic atrophic gastritis, and  related to her pernicious anemia, typically tumor less than 1-2 cm in size can be periodically removed endoscopically especially those are low-grade carcinoid and less likely to metastasize,   Patient was seen by Dr. Oviedo and recommended watchful waiting    She has been on periodic endoscopy and endoscopic removal of gastric polyp, patient has had Pediotic EGD and removal of gastric polyp which was consistent with low-grade neuroendocrine tumors     pernicious anemia diagnosed at age 29  Vitamin B12 deficiency, patient is on vitamin B12 injection once a month's/self injection.      Iron deficiency anemia-patient received IV iron in September 2020 with normalization of CBC   Became recurrently iron deficient in September 2024 and not responding to oral iron         Past medical history  Diabetes mellitus, hypothyroidism, hypertension,  hyperlipidemia, GERD, shingles in 2007, anxiety,  Cholecystectomy in August 2024    Interval history    Patient returns today for follow-up on gastric neuroendocrine tumors, low-grade,   Patient had additional EGD done and colonoscopy done by Dr. Villarreal on 14 March 2024 and found 6 gastric polyps, but those were not malignant patient also had a colonoscopy at the same time.   Dr. Villarreal recommended EGD once a year  Patient is currently asymptomatic  Currently takes B12 self injection once a month  Patient is taking oral iron 3 times a week  She had a cholecystectomy done in August 2024        PHYSICAL EXAM:    Gen:  Conscious,  no acute distress,   HEENT: Normocephalic, no icterus. . No nasal discharge,  Oral mucosa moist  Chest: Bilateral symmetrical, Bilateral AE        CVS: S1S2.   Abdomen: Soft, no guarding or rigidity BS+   Extremities: No C/C   Skin: No petechiae.        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  Random gastric biopsy was consistent with atrophic gastritis.  Gastrin level extremely elevated at around 2300 and 24-hour urine for 5 HIAA level normal, chromogranin A is elevated  Patient has a type I gastric carcinoid which is associated with chronic atrophic gastritis, and  related to her pernicious anemia, typically tumor less than 1-2 cm in size can be periodically removed endoscopically especially those are low-grade carcinoid and less likely to metastasize,   Patient was seen by Dr. Oviedo and recommended watchful waiting    She has been on periodic endoscopy and endoscopic removal of gastric polyp,   Had repeat EGD done in February 2023 and removed additional 14 polyps which were all  "subcentimeter, and came back positive for low-grade neuroendocrine tumor.    Repeat EGD on March 14, 2024 showed multiple gastric polyps, 6 polyps were resected from the body and the antrum biopsy result was negative for malignancy and H. pylori was negative, colonoscopy had 1 tubular adenoma in the cecum other 1 was hyperplastic.    Dr. Villarreal is planning repeat EGD once a year per patient  She is getting B12 shots self injection once a month        Pernicious anemia/Vitamin B12 deficiency, patient is on vitamin B12 injection once a month's/self injection.      Patient is iron deficient, she has received IV iron in 2020, she is persistently iron deficient without any clear explanation even though she is taking oral iron and does not seem to be absorbing, she is not menstruating and and had a colonoscopy 6 months ago.  We will give her IV Feraheme x 1 and recheck in 4-month      VITALS:   2.15 meters squared /72   Pulse 76   Temp 35.9 °C (96.6 °F)   Resp 14   Wt 97.8 kg (215 lb 9.8 oz)   SpO2 93%   BMI 33.77 kg/m²     LABS:    CBC:  Recent Labs     09/18/24  0910 03/26/24  1146 01/02/24  0921 09/12/23  1441   WBC 5.2 4.7 4.8 5.6   HGB 12.8 13.6 13.0 13.5   HCT 39.7 41.0 40.6 42.7    310 379 353   MCV 85 86 88 88       CMP:  Recent Labs     09/18/24  0910 03/26/24  1146 09/12/23  1441    136 139   K 4.3 4.1 4.0    102 102   CO2 26 22 26   ANIONGAP 13 16 15   BUN 15 16 18   CREATININE 0.63 0.70 0.70   EGFR >90 >90  --      Recent Labs     09/18/24  0910 03/26/24  1146 09/12/23  1441   ALBUMIN 4.4 4.5 4.6   ALKPHOS 33 37 39   ALT 39 74* 63*   AST 28 44* 39   BILITOT 0.4 0.4 0.3       HEME/ENDO:  Recent Labs     09/18/24  0910 03/26/24  1146 01/02/24  0921 09/12/23  1441   FERRITIN 18 28 50 31   IRONSAT  --   --   --  NOT CALC.   [id number] 270 214  --  312        MICRO: No results for input(s): \"ESR\", \"CRP\", \"PROCAL\" in the last 17148 hours.  No results found for the last 90 " "days.        TUMOR MARKERS:  No results found for: \"LABCA2\", \"CEA\", \"\", \"PSA\", \"AFPTM\", \"HCGTM\", \"\"             IMAGING:         XR cholangiogram intraoperative  Fluoroscopic guidance for intraoperative cholangiogram 8/13/2024 9:17 AM CDT    History:  OTHER REASON    Tech notes: WHAT SYMPTOMS ARE YOU EXPERIENCING?; CHOLELITHIASIS; FLUOROSCOPY TIME (MINUTES); .4M; NUMBER OF FLUORO SPOT IMAGES; 8.00; mGy; 19.70    Findings:    Fluoroscopic guidance was provided in the operating room for intraoperative cholangiogram.  0.4 minutes of fluoroscopy time was provided, 19.70 mGy (cumulative air kerma). 8 images are submitted. Please see the operative report for additional information.    Impression:    Fluoroscopic guidance was provided for intraoperative cholangiogram as discussed above    []    Electronically signed by:  Cuba Nolasco MD  08/13/2024 01:28 PM EDT RP Workstation: [id number]  Technologist:  JR MANNY  Dictated By:   CUBA NOLASCO MD  Signed By:     CUBA NOLASCO MD    Signed Out:    08/13/24 13:28:41       Current Outpatient Medications   Medication Sig Dispense Refill    amLODIPine (Norvasc) 2.5 mg tablet       aspirin 81 mg EC tablet Take by mouth.      atorvastatin (Lipitor) 10 mg tablet       cholecalciferol (Vitamin D-3) 50 MCG (2000 UT) tablet Take by mouth.      cyanocobalamin (Vitamin B-12) 1,000 mcg/mL injection       fenofibrate micronized (Lofibra) 134 mg capsule       fluticasone (Flonase) 50 mcg/actuation nasal spray       glipiZIDE (Glucotrol) 10 mg tablet       levothyroxine (Synthroid, Levoxyl) 50 mcg tablet       losartan-hydrochlorothiazide (Hyzaar) 100-25 mg tablet       metFORMIN (Glucophage) 1,000 mg tablet Take 1 tablet (1,000 mg) by mouth 2 times daily (morning and late afternoon).      nitrofurantoin, macrocrystal-monohydrate, (Macrobid) 100 mg capsule TAKE 1 CAPSULE AFTER SEX AND REPEAT 1 CAPSULE IN 12 HOURS ORALLY EVERY 90 DAYS      pioglitazone (Actos) 15 mg tablet Take 1 " tablet (15 mg) by mouth once daily.      atorvastatin calcium (ATORVASTATIN ORAL) Take 10 mg by mouth.      fluconazole (Diflucan) 200 mg tablet 1 TABLET ORALLY EVERY OTHER DAY FOR 3 DOSES 7 DAYS      glipiZIDE (Glucotrol) 5 mg tablet Take 1 tablet (5 mg) by mouth 2 times a day.      hydrocortisone-acetic acid (Vosol-HC) otic solution       hyoscyamine (Anaspaz, Levsin) 0.125 mg tablet Take 1 tablet (0.125 mg) by mouth every 8 hours if needed.      levothyroxine (Tirosint) 50 mcg capsule 1 capsule (50 mcg).      metFORMIN (Glucophage) 500 mg tablet Take by mouth.      metformin HCl (METFORMIN ORAL) 1,000 mg.      ondansetron ODT (Zofran-ODT) 4 mg disintegrating tablet TAKE 1 TABLET BY MOUTH EVERY 6 HOURS AS NEEDED FOR NAUSEA AND VOMITING FOR UP TO 7 DAYS      simvastatin (Zocor) 5 mg tablet Take 1 tablet (5 mg) by mouth once daily.      SITagliptin phosphate (Januvia) 100 mg tablet Take 1 tablet (100 mg) by mouth once daily.       No current facility-administered medications for this visit.                  SOCIAL HISTORY:    reports current alcohol use of about 2.0 standard drinks of alcohol per week.   reports no history of drug use.,   Tobacco Use: Medium Risk (7/25/2024)    Patient History     Smoking Tobacco Use: Former     Smokeless Tobacco Use: Unknown     Passive Exposure: Not on file       FAMILY HISTORY:  Family History   Problem Relation Name Age of Onset    Diabetes Mother Monique Fry     Hypertension Father Branden Fry        ALLERGY:   Sulfamethoxazole-trimethoprim, Canagliflozin, and Sulfa (sulfonamide antibiotics)              Medication reviewed in e-chart  Patient is monitored for medication toxicity  labs reviewed and interpreted independently, X rays independently reviewed  Notes from other physicians involved in care were reviewed    Charting was completed using voice recognition technology and may include unintended errors.    BRANDT GARY MD, PAKO.    [facility]  Master Clinician in Hematology and Oncology  Medical Director, [facility] [place] [facility] at [facility].  [place] office  Phone [phone]  Fax      [phone]  [facility] /[place].  Phone [phone]  Fax     [phone]

## 2024-09-24 NOTE — PROGRESS NOTES
IV Iron x 1 next week.  Teaching provided regarding Feraheme.  Patient previously received Iron At [facility] several years ago.  Patient to DC oral iron.  Follow-up with labs prior at [facility].  Call back instructions reviewed.  Patient verbalized understanding.

## 2024-09-30 LAB — CHROMOGRANIN A, LC/MS/MS: 989 NG/ML

## 2024-10-03 ENCOUNTER — INFUSION (OUTPATIENT)
Dept: HEMATOLOGY/ONCOLOGY | Facility: CLINIC | Age: 64
End: 2024-10-03
Payer: COMMERCIAL

## 2024-10-03 VITALS
BODY MASS INDEX: 34.52 KG/M2 | TEMPERATURE: 97.2 F | DIASTOLIC BLOOD PRESSURE: 75 MMHG | OXYGEN SATURATION: 96 % | WEIGHT: 219.91 LBS | RESPIRATION RATE: 16 BRPM | SYSTOLIC BLOOD PRESSURE: 130 MMHG | HEART RATE: 68 BPM | HEIGHT: 67 IN

## 2024-10-03 DIAGNOSIS — D50.0 IRON DEFICIENCY ANEMIA DUE TO CHRONIC BLOOD LOSS: ICD-10-CM

## 2024-10-03 LAB
ERYTHROCYTE [DISTWIDTH] IN BLOOD BY AUTOMATED COUNT: 13.8 % (ref 11.5–14.5)
HCT VFR BLD AUTO: 39.4 % (ref 36–46)
HGB BLD-MCNC: 12.7 G/DL (ref 12–16)
MCH RBC QN AUTO: 27.1 PG (ref 26–34)
MCHC RBC AUTO-ENTMCNC: 32.2 G/DL (ref 32–36)
MCV RBC AUTO: 84 FL (ref 80–100)
NRBC BLD-RTO: NORMAL /100{WBCS}
PLATELET # BLD AUTO: 306 X10*3/UL (ref 150–450)
RBC # BLD AUTO: 4.68 X10*6/UL (ref 4–5.2)
WBC # BLD AUTO: 5.2 X10*3/UL (ref 4.4–11.3)

## 2024-10-03 PROCEDURE — 82728 ASSAY OF FERRITIN: CPT

## 2024-10-03 PROCEDURE — 83540 ASSAY OF IRON: CPT

## 2024-10-03 PROCEDURE — 96365 THER/PROPH/DIAG IV INF INIT: CPT | Mod: INF

## 2024-10-03 PROCEDURE — 2500000004 HC RX 250 GENERAL PHARMACY W/ HCPCS (ALT 636 FOR OP/ED): Mod: JZ | Performed by: INTERNAL MEDICINE

## 2024-10-03 PROCEDURE — 85027 COMPLETE CBC AUTOMATED: CPT

## 2024-10-03 RX ORDER — DIPHENHYDRAMINE HYDROCHLORIDE 50 MG/ML
50 INJECTION INTRAMUSCULAR; INTRAVENOUS AS NEEDED
OUTPATIENT
Start: 2024-10-10

## 2024-10-03 RX ORDER — ALBUTEROL SULFATE 0.83 MG/ML
3 SOLUTION RESPIRATORY (INHALATION) AS NEEDED
Status: DISCONTINUED | OUTPATIENT
Start: 2024-10-03 | End: 2024-10-03 | Stop reason: HOSPADM

## 2024-10-03 RX ORDER — ALBUTEROL SULFATE 0.83 MG/ML
3 SOLUTION RESPIRATORY (INHALATION) AS NEEDED
OUTPATIENT
Start: 2024-10-10

## 2024-10-03 RX ORDER — EPINEPHRINE 0.3 MG/.3ML
0.3 INJECTION SUBCUTANEOUS EVERY 5 MIN PRN
OUTPATIENT
Start: 2024-10-10

## 2024-10-03 RX ORDER — FAMOTIDINE 10 MG/ML
20 INJECTION INTRAVENOUS ONCE AS NEEDED
OUTPATIENT
Start: 2024-10-10

## 2024-10-03 RX ORDER — EPINEPHRINE 0.3 MG/.3ML
0.3 INJECTION SUBCUTANEOUS EVERY 5 MIN PRN
Status: DISCONTINUED | OUTPATIENT
Start: 2024-10-03 | End: 2024-10-03 | Stop reason: HOSPADM

## 2024-10-03 RX ORDER — DIPHENHYDRAMINE HYDROCHLORIDE 50 MG/ML
50 INJECTION INTRAMUSCULAR; INTRAVENOUS AS NEEDED
Status: DISCONTINUED | OUTPATIENT
Start: 2024-10-03 | End: 2024-10-03 | Stop reason: HOSPADM

## 2024-10-03 RX ORDER — FAMOTIDINE 10 MG/ML
20 INJECTION INTRAVENOUS ONCE AS NEEDED
Status: DISCONTINUED | OUTPATIENT
Start: 2024-10-03 | End: 2024-10-03 | Stop reason: HOSPADM

## 2024-10-03 ASSESSMENT — PAIN SCALES - GENERAL
PAINLEVEL: 0-NO PAIN
PAINLEVEL_OUTOF10: 0-NO PAIN

## 2024-10-03 NOTE — PROGRESS NOTES
[facility] Infusion Note     Yessica Conner is a 63 y.o. year old female here today,10/03/24,  in the [facility] infusion center for her Feraheme IV infusion.       Medications given:  Administrations This Visit       ferumoxytol (Feraheme) 510 mg in sodium chloride 0.9% 127 mL IV       Admin Date  10/03/2024 Action  New Bag Dose  510 mg Rate  381 mL/hr Route  intravenous Documented By  Leslie Lee, RN                    Pt tolerated infusion well and was agreeable to the 30 minute observation period. Pt denied any headache, lightheaded or dizziness, chest pain, shortness of breath, n/v/d, and pain. VSS. Pt was discharged to home in stable condition. Pt ambulated to exit independently with a slow and steady gait. Pt had no further questions or concerns at this time.

## 2024-10-04 LAB
FERRITIN SERPL-MCNC: 23 NG/ML (ref 8–150)
IRON SATN MFR SERPL: ABNORMAL %
IRON SERPL-MCNC: 65 UG/DL (ref 35–150)
TIBC SERPL-MCNC: ABNORMAL UG/DL
UIBC SERPL-MCNC: >450 UG/DL (ref 110–370)

## 2024-10-08 ENCOUNTER — TELEPHONE (OUTPATIENT)
Dept: SCHEDULING | Age: 64
End: 2024-10-08
Payer: COMMERCIAL

## 2024-10-08 NOTE — TELEPHONE ENCOUNTER
Patient called in to see what she was scheduled for on Thursday. She stated she keeps getting an alert about an upcoming appt that she wasn't aware of. I pulled her appt list and advised her it is for Iron infusion. She stated she was told hit would only be one treatment. She asked to confirm her treatment plan.     I told her I would message her clinical team and ask that they call her back to confirm her treatment plan. She understood and agreed. Please call her cell phone number.

## 2024-10-08 NOTE — TELEPHONE ENCOUNTER
Chart reviewed by RN.  Will cancel 10/10/24 appt.  Per physician note patient to receive IV Iron x 1 which she received 10/3/24.  Call placed to patient.  Notified will cancel this appt.  Forwarded to scheduling to cancel.  Call back instructions reviewed.  Patient verbalized understanding.

## 2024-10-10 ENCOUNTER — APPOINTMENT (OUTPATIENT)
Dept: HEMATOLOGY/ONCOLOGY | Facility: CLINIC | Age: 64
End: 2024-10-10
Payer: COMMERCIAL

## 2025-01-21 ENCOUNTER — LAB (OUTPATIENT)
Dept: LAB | Facility: LAB | Age: 65
End: 2025-01-21
Payer: COMMERCIAL

## 2025-01-21 DIAGNOSIS — D3A.8 NEUROENDOCRINE TUMOR: ICD-10-CM

## 2025-01-21 DIAGNOSIS — K29.40 ATROPHIC GASTRITIS WITHOUT HEMORRHAGE: ICD-10-CM

## 2025-01-21 DIAGNOSIS — D3A.092 BENIGN CARCINOID TUMOR OF STOMACH: ICD-10-CM

## 2025-01-21 DIAGNOSIS — D50.0 IRON DEFICIENCY ANEMIA DUE TO CHRONIC BLOOD LOSS: ICD-10-CM

## 2025-01-21 DIAGNOSIS — D51.0 PERNICIOUS ANEMIA: ICD-10-CM

## 2025-01-21 LAB
ALBUMIN SERPL BCP-MCNC: 4.7 G/DL (ref 3.4–5)
ALP SERPL-CCNC: 42 U/L (ref 33–136)
ALT SERPL W P-5'-P-CCNC: 64 U/L (ref 7–45)
ANION GAP SERPL CALC-SCNC: 15 MMOL/L (ref 10–20)
AST SERPL W P-5'-P-CCNC: 45 U/L (ref 9–39)
BASOPHILS # BLD AUTO: 0.04 X10*3/UL (ref 0–0.1)
BASOPHILS NFR BLD AUTO: 0.8 %
BILIRUB SERPL-MCNC: 0.5 MG/DL (ref 0–1.2)
BUN SERPL-MCNC: 16 MG/DL (ref 6–23)
CALCIUM SERPL-MCNC: 10.2 MG/DL (ref 8.6–10.6)
CHLORIDE SERPL-SCNC: 102 MMOL/L (ref 98–107)
CO2 SERPL-SCNC: 27 MMOL/L (ref 21–32)
CREAT SERPL-MCNC: 0.72 MG/DL (ref 0.5–1.05)
EGFRCR SERPLBLD CKD-EPI 2021: >90 ML/MIN/1.73M*2
EOSINOPHIL # BLD AUTO: 0.09 X10*3/UL (ref 0–0.7)
EOSINOPHIL NFR BLD AUTO: 1.7 %
ERYTHROCYTE [DISTWIDTH] IN BLOOD BY AUTOMATED COUNT: 13.2 % (ref 11.5–14.5)
FERRITIN SERPL-MCNC: 68 NG/ML (ref 8–150)
GLUCOSE SERPL-MCNC: 214 MG/DL (ref 74–99)
HCT VFR BLD AUTO: 44 % (ref 36–46)
HGB BLD-MCNC: 14.2 G/DL (ref 12–16)
IMM GRANULOCYTES # BLD AUTO: 0.03 X10*3/UL (ref 0–0.7)
IMM GRANULOCYTES NFR BLD AUTO: 0.6 % (ref 0–0.9)
IRON SATN MFR SERPL: ABNORMAL %
IRON SERPL-MCNC: 96 UG/DL (ref 35–150)
LYMPHOCYTES # BLD AUTO: 1.51 X10*3/UL (ref 1.2–4.8)
LYMPHOCYTES NFR BLD AUTO: 28.7 %
MCH RBC QN AUTO: 28.9 PG (ref 26–34)
MCHC RBC AUTO-ENTMCNC: 32.3 G/DL (ref 32–36)
MCV RBC AUTO: 89 FL (ref 80–100)
MONOCYTES # BLD AUTO: 0.49 X10*3/UL (ref 0.1–1)
MONOCYTES NFR BLD AUTO: 9.3 %
NEUTROPHILS # BLD AUTO: 3.11 X10*3/UL (ref 1.2–7.7)
NEUTROPHILS NFR BLD AUTO: 58.9 %
NRBC BLD-RTO: 0 /100 WBCS (ref 0–0)
PLATELET # BLD AUTO: 308 X10*3/UL (ref 150–450)
POTASSIUM SERPL-SCNC: 4.4 MMOL/L (ref 3.5–5.3)
PROT SERPL-MCNC: 6.9 G/DL (ref 6.4–8.2)
RBC # BLD AUTO: 4.92 X10*6/UL (ref 4–5.2)
SODIUM SERPL-SCNC: 140 MMOL/L (ref 136–145)
TIBC SERPL-MCNC: ABNORMAL UG/DL
UIBC SERPL-MCNC: >450 UG/DL (ref 110–370)
WBC # BLD AUTO: 5.3 X10*3/UL (ref 4.4–11.3)

## 2025-01-21 PROCEDURE — 83540 ASSAY OF IRON: CPT

## 2025-01-21 PROCEDURE — 83550 IRON BINDING TEST: CPT

## 2025-01-21 PROCEDURE — 80053 COMPREHEN METABOLIC PANEL: CPT

## 2025-01-21 PROCEDURE — 86316 IMMUNOASSAY TUMOR OTHER: CPT

## 2025-01-21 PROCEDURE — 85025 COMPLETE CBC W/AUTO DIFF WBC: CPT

## 2025-01-21 PROCEDURE — 36415 COLL VENOUS BLD VENIPUNCTURE: CPT

## 2025-01-21 PROCEDURE — 82728 ASSAY OF FERRITIN: CPT

## 2025-01-26 LAB — CHROMOGRANIN A, LC/MS/MS: 1379 NG/ML

## 2025-01-31 ENCOUNTER — OFFICE VISIT (OUTPATIENT)
Dept: HEMATOLOGY/ONCOLOGY | Facility: CLINIC | Age: 65
End: 2025-01-31
Payer: COMMERCIAL

## 2025-01-31 VITALS
SYSTOLIC BLOOD PRESSURE: 141 MMHG | DIASTOLIC BLOOD PRESSURE: 88 MMHG | HEART RATE: 72 BPM | TEMPERATURE: 96.4 F | WEIGHT: 228.29 LBS | RESPIRATION RATE: 16 BRPM | OXYGEN SATURATION: 94 % | BODY MASS INDEX: 35.75 KG/M2

## 2025-01-31 DIAGNOSIS — D3A.8 NEUROENDOCRINE TUMOR: ICD-10-CM

## 2025-01-31 DIAGNOSIS — D3A.092 BENIGN CARCINOID TUMOR OF STOMACH: ICD-10-CM

## 2025-01-31 DIAGNOSIS — D51.0 PERNICIOUS ANEMIA: ICD-10-CM

## 2025-01-31 DIAGNOSIS — K29.40 ATROPHIC GASTRITIS WITHOUT HEMORRHAGE: ICD-10-CM

## 2025-01-31 DIAGNOSIS — D50.0 IRON DEFICIENCY ANEMIA DUE TO CHRONIC BLOOD LOSS: ICD-10-CM

## 2025-01-31 PROCEDURE — 99214 OFFICE O/P EST MOD 30 MIN: CPT | Performed by: INTERNAL MEDICINE

## 2025-01-31 PROCEDURE — 3079F DIAST BP 80-89 MM HG: CPT | Performed by: INTERNAL MEDICINE

## 2025-01-31 PROCEDURE — 3077F SYST BP >= 140 MM HG: CPT | Performed by: INTERNAL MEDICINE

## 2025-01-31 PROCEDURE — G2211 COMPLEX E/M VISIT ADD ON: HCPCS | Performed by: INTERNAL MEDICINE

## 2025-01-31 ASSESSMENT — PAIN SCALES - GENERAL: PAINLEVEL_OUTOF10: 0-NO PAIN

## 2025-01-31 NOTE — PATIENT INSTRUCTIONS
Patient Instructions:       RTC 4 m     CBC CMP  chromogranin A,, serum B12 and iron group and ferritin

## 2025-01-31 NOTE — PROGRESS NOTES
Follow-up in 4 months with labs prior at [facility].  Patient teaching provided regarding [facility] transition and patient notified paper lab requisition will be required for non-[facility] labs.  Lab requisition provided.  Call back instructions reviewed.  Patient verbalized understanding.

## 2025-01-31 NOTE — PROGRESS NOTES
Patient ID: Yessica Conner is a 64 y.o. female.  Referring Physician: Nathan Hernandez MD  [address] [facility], [facility] [address]  [place] [address]  Primary Care Provider: Khris Harper MD    ORDERS & PATIENT INSTRUCTIONS:    Patient Instructions:           RTC 4 m      CBC CMP  chromogranin A,, serum B12 and iron group and ferritin         ASSESSMENT, PROBLEM LIST, DECISION MAKING, PLAN.      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  Random gastric biopsy was consistent with atrophic gastritis.  Gastrin level extremely elevated at around 2300 and 24-hour urine for 5 HIAA level normal, chromogranin A is elevated  Patient has a type I gastric carcinoid which is associated with chronic atrophic gastritis, and  related to her pernicious anemia, typically tumor less than 1-2 cm in size can be periodically removed endoscopically especially those are low-grade carcinoid and less likely to metastasize,   Patient was seen by Dr. Oviedo and recommended watchful waiting    She has been on periodic endoscopy and endoscopic removal of gastric polyp, patient has had Pediotic EGD and removal of gastric polyp which was consistent with low-grade neuroendocrine tumors     pernicious anemia diagnosed at age 29  Vitamin B12 deficiency, patient is on vitamin B12 injection once a month's/self injection.      Iron deficiency anemia-patient received IV iron in September 2020 with normalization of CBC   Became recurrently iron deficient in September 2024 and not responding to oral iron         Past medical history  Diabetes mellitus, hypothyroidism, hypertension, hyperlipidemia, GERD, shingles in 2007,  anxiety,  Cholecystectomy in August 2024    Interval history    Patient returns today for follow-up on gastric neuroendocrine tumors, low-grade,   Patient had additional EGD done and colonoscopy done by Dr. Villarreal on 14 March 2024 and found 6 gastric polyps, but those were not malignant patient also had a colonoscopy at the same time.   Dr. Villarreal recommended EGD once a year and is scheduled again in March 2025  Currently takes B12 self injection once a month  Patient received IV Feraheme 510 mg in October 2024  Patient has since discontinued oral iron  Currently she is asymptomatic  She had a cholecystectomy done in August 2024        PHYSICAL EXAM:    Gen:  Conscious,  no acute distress,   HEENT: Normocephalic, no icterus. . No nasal discharge,  Oral mucosa moist  Chest: Bilateral symmetrical, Bilateral AE        CVS: S1S2.   Abdomen: Soft, no guarding or rigidity BS+   Extremities: No C/C   Skin: No petechiae.        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  Random gastric biopsy was consistent with atrophic gastritis.  Gastrin level extremely elevated at around 2300 and 24-hour urine for 5 HIAA level normal, chromogranin A is elevated  Patient has a type I gastric carcinoid which is associated with chronic atrophic gastritis, and  related to her pernicious anemia, typically tumor less than 1-2 cm in size can be periodically removed endoscopically especially those are low-grade carcinoid and less likely to metastasize,   Patient was seen by Dr. Oviedo and recommended watchful waiting    She has been on periodic endoscopy and endoscopic removal of gastric polyp,   Had repeat EGD done in February 2023 and removed  additional 14 polyps which were all subcentimeter, and came back positive for low-grade neuroendocrine tumor.    Repeat EGD on March 14, 2024 showed multiple gastric polyps, 6 polyps were resected from the body and the antrum biopsy result was negative for malignancy and H. pylori was negative, colonoscopy had 1 tubular adenoma in the cecum other 1 was hyperplastic.  Dr. Villarreal recommended EGD once a year and is scheduled again in March 2025  Currently takes B12 self injection once a month  Patient received IV Feraheme 510 mg in October 2024  Patient has since discontinued oral iron  Currently she is asymptomatic        Pernicious anemia/Vitamin B12 deficiency, patient is on vitamin B12 injection once a month's/self injection.    Patient has been persistently iron deficient in the past, she received IV iron in 2020, she was on oral iron but did not seem to be absorbing so she received additional IV iron 510 mg Feraheme in October 2024, hemoglobin has improved, ferritin is stable although she has low iron saturation but it is chronic, for now we will monitor  She has discontinued oral iron  Continue watchful waiting      VITALS:   2.22 meters squared /88   Pulse 72   Temp 35.8 °C (96.4 °F)   Resp 16   Wt 104 kg (228 lb 4.6 oz)   SpO2 94%   BMI 35.75 kg/m²     LABS:    CBC:  Recent Labs     01/21/25  0928 10/03/24  1328 09/18/24  0910 03/26/24  1146 01/02/24  0921   WBC 5.3 5.2 5.2 4.7 4.8   HGB 14.2 12.7 12.8 13.6 13.0   HCT 44.0 39.4 39.7 41.0 40.6    306 305 310 379   MCV 89 84 85 86 88       CMP:  Recent Labs     01/21/25  0928 09/18/24  0910 03/26/24  1146 09/12/23  1441    140 136 139   K 4.4 4.3 4.1 4.0    105 102 102   CO2 27 26 22 26   ANIONGAP 15 13 16 15   BUN 16 15 16 18   CREATININE 0.72 0.63 0.70 0.70   EGFR >90 >90 >90  --      Recent Labs     01/21/25  0928 09/18/24  0910 03/26/24  1146 09/12/23  1441   ALBUMIN 4.7 4.4 4.5 4.6   ALKPHOS 42 33 37 39   ALT 64* 39 74* 63*  "  AST 45* 28 44* 39   BILITOT 0.5 0.4 0.4 0.3       HEME/ENDO:  Recent Labs     01/21/25  0928 10/03/24  1328 09/18/24  0910 03/26/24  1146 01/02/24  0921 09/12/23  1441   FERRITIN 68 23 18 28   < > 31   IRONSAT  --   --   --   --   --  NOT CALC.   [id number]  --   --  270 584  --  312    < > = values in this interval not displayed.        MICRO: No results for input(s): \"ESR\", \"CRP\", \"PROCAL\" in the last 55793 hours.  No results found for the last 90 days.        TUMOR MARKERS:  No results found for: \"LABCA2\", \"CEA\", \"\", \"PSA\", \"AFPTM\", \"HCGTM\", \"\"             IMAGING:         XR cholangiogram intraoperative  Fluoroscopic guidance for intraoperative cholangiogram 8/13/2024 9:17 AM CDT    History:  OTHER REASON    Tech notes: WHAT SYMPTOMS ARE YOU EXPERIENCING?; CHOLELITHIASIS; FLUOROSCOPY TIME (MINUTES); .4M; NUMBER OF FLUORO SPOT IMAGES; 8.00; mGy; 19.70    Findings:    Fluoroscopic guidance was provided in the operating room for intraoperative cholangiogram.  0.4 minutes of fluoroscopy time was provided, 19.70 mGy (cumulative air kerma). 8 images are submitted. Please see the operative report for additional information.    Impression:    Fluoroscopic guidance was provided for intraoperative cholangiogram as discussed above    []    Electronically signed by:  Cuba Nolasco MD  08/13/2024 01:28 PM EDT  Workstation: [id number]  Technologist:  JR BRYANT JH  Dictated By:   CUBA NOLASCO MD  Signed By:     CUBA NOLASCO MD    Signed Out:    08/13/24 13:28:41       Current Outpatient Medications   Medication Sig Dispense Refill    amLODIPine (Norvasc) 2.5 mg tablet       aspirin 81 mg EC tablet Take by mouth.      atorvastatin (Lipitor) 10 mg tablet       atorvastatin calcium (ATORVASTATIN ORAL) Take 10 mg by mouth.      cholecalciferol (Vitamin D-3) 50 MCG (2000 UT) tablet Take by mouth.      cyanocobalamin (Vitamin B-12) 1,000 mcg/mL injection       fenofibrate micronized (Lofibra) 134 mg capsule       " fluconazole (Diflucan) 200 mg tablet 1 TABLET ORALLY EVERY OTHER DAY FOR 3 DOSES 7 DAYS      fluticasone (Flonase) 50 mcg/actuation nasal spray       glipiZIDE (Glucotrol) 10 mg tablet       glipiZIDE (Glucotrol) 5 mg tablet Take 1 tablet (5 mg) by mouth 2 times a day.      hydrocortisone-acetic acid (Vosol-HC) otic solution       hyoscyamine (Anaspaz, Levsin) 0.125 mg tablet Take 1 tablet (0.125 mg) by mouth every 8 hours if needed.      levothyroxine (Synthroid, Levoxyl) 50 mcg tablet       levothyroxine (Tirosint) 50 mcg capsule 1 capsule (50 mcg).      losartan-hydrochlorothiazide (Hyzaar) 100-25 mg tablet       metFORMIN (Glucophage) 1,000 mg tablet Take 1 tablet (1,000 mg) by mouth 2 times daily (morning and late afternoon).      metFORMIN (Glucophage) 500 mg tablet Take by mouth.      metformin HCl (METFORMIN ORAL) 1,000 mg.      nitrofurantoin, macrocrystal-monohydrate, (Macrobid) 100 mg capsule TAKE 1 CAPSULE AFTER SEX AND REPEAT 1 CAPSULE IN 12 HOURS ORALLY EVERY 90 DAYS      ondansetron ODT (Zofran-ODT) 4 mg disintegrating tablet TAKE 1 TABLET BY MOUTH EVERY 6 HOURS AS NEEDED FOR NAUSEA AND VOMITING FOR UP TO 7 DAYS      pioglitazone (Actos) 15 mg tablet Take 1 tablet (15 mg) by mouth once daily.      simvastatin (Zocor) 5 mg tablet Take 1 tablet (5 mg) by mouth once daily.      SITagliptin phosphate (Januvia) 100 mg tablet Take 1 tablet (100 mg) by mouth once daily.       No current facility-administered medications for this visit.                  SOCIAL HISTORY:    reports current alcohol use of about 2.0 standard drinks of alcohol per week.   reports no history of drug use.,   Tobacco Use: Medium Risk (10/15/2024)    Received from [facility]    Patient History     Smoking Tobacco Use: Former     Smokeless Tobacco Use: Never     Passive Exposure: Not on file       FAMILY HISTORY:  Family History   Problem Relation Name Age of Onset    Diabetes Mother Monique Fry     Hypertension Father Branden  Ang        ALLERGY:   Sulfamethoxazole-trimethoprim, Canagliflozin, and Sulfa (sulfonamide antibiotics)              Medication reviewed in e-chart  Patient is monitored for medication toxicity  labs reviewed and interpreted independently, X rays independently reviewed  Notes from other physicians involved in care were reviewed    Charting was completed using voice recognition technology and may include unintended errors.    BRANDT GARY MD, PAKO.    Jose David Quick Master Clinician in Hematology and Oncology  Medical Director, [facility] at [facility].  [place] office  Phone [phone]  Fax      [phone]  [facility] /[place].  Phone [phone]  Fax     [phone]

## 2025-05-13 LAB — NON-UH HIE POC GLUCOSE: 167 MG/DL (ref 72–100)

## 2025-05-28 ENCOUNTER — LAB (OUTPATIENT)
Dept: LAB | Facility: HOSPITAL | Age: 65
End: 2025-05-28
Payer: COMMERCIAL

## 2025-05-28 DIAGNOSIS — D3A.092 BENIGN CARCINOID TUMOR OF THE STOMACH: ICD-10-CM

## 2025-05-28 DIAGNOSIS — K29.40 CHRONIC ATROPHIC GASTRITIS WITHOUT BLEEDING: Primary | ICD-10-CM

## 2025-05-28 LAB
ALBUMIN SERPL BCP-MCNC: 4.5 G/DL (ref 3.4–5)
ALP SERPL-CCNC: 40 U/L (ref 33–136)
ALT SERPL W P-5'-P-CCNC: 55 U/L (ref 7–45)
ANION GAP SERPL CALC-SCNC: 15 MMOL/L (ref 10–20)
AST SERPL W P-5'-P-CCNC: 38 U/L (ref 9–39)
BASOPHILS # BLD AUTO: 0.05 X10*3/UL (ref 0–0.1)
BASOPHILS NFR BLD AUTO: 1.3 %
BILIRUB SERPL-MCNC: 0.4 MG/DL (ref 0–1.2)
BUN SERPL-MCNC: 14 MG/DL (ref 6–23)
CALCIUM SERPL-MCNC: 10.1 MG/DL (ref 8.6–10.6)
CHLORIDE SERPL-SCNC: 102 MMOL/L (ref 98–107)
CO2 SERPL-SCNC: 26 MMOL/L (ref 21–32)
CREAT SERPL-MCNC: 0.7 MG/DL (ref 0.5–1.05)
EGFRCR SERPLBLD CKD-EPI 2021: >90 ML/MIN/1.73M*2
EOSINOPHIL # BLD AUTO: 0.05 X10*3/UL (ref 0–0.7)
EOSINOPHIL NFR BLD AUTO: 1.3 %
ERYTHROCYTE [DISTWIDTH] IN BLOOD BY AUTOMATED COUNT: 13.2 % (ref 11.5–14.5)
FERRITIN SERPL-MCNC: 41 NG/ML (ref 8–150)
GLUCOSE SERPL-MCNC: 153 MG/DL (ref 74–99)
HCT VFR BLD AUTO: 40 % (ref 36–46)
HGB BLD-MCNC: 13.3 G/DL (ref 12–16)
IMM GRANULOCYTES # BLD AUTO: 0.02 X10*3/UL (ref 0–0.7)
IMM GRANULOCYTES NFR BLD AUTO: 0.5 % (ref 0–0.9)
IRON SATN MFR SERPL: ABNORMAL %
IRON SERPL-MCNC: 76 UG/DL (ref 35–150)
LYMPHOCYTES # BLD AUTO: 1.27 X10*3/UL (ref 1.2–4.8)
LYMPHOCYTES NFR BLD AUTO: 32.2 %
MCH RBC QN AUTO: 28.5 PG (ref 26–34)
MCHC RBC AUTO-ENTMCNC: 33.3 G/DL (ref 32–36)
MCV RBC AUTO: 86 FL (ref 80–100)
MONOCYTES # BLD AUTO: 0.36 X10*3/UL (ref 0.1–1)
MONOCYTES NFR BLD AUTO: 9.1 %
NEUTROPHILS # BLD AUTO: 2.2 X10*3/UL (ref 1.2–7.7)
NEUTROPHILS NFR BLD AUTO: 55.6 %
NRBC BLD-RTO: 0 /100 WBCS (ref 0–0)
PLATELET # BLD AUTO: 287 X10*3/UL (ref 150–450)
POTASSIUM SERPL-SCNC: 4.2 MMOL/L (ref 3.5–5.3)
PROT SERPL-MCNC: 6.7 G/DL (ref 6.4–8.2)
RBC # BLD AUTO: 4.67 X10*6/UL (ref 4–5.2)
SODIUM SERPL-SCNC: 139 MMOL/L (ref 136–145)
TIBC SERPL-MCNC: ABNORMAL UG/DL
UIBC SERPL-MCNC: >450 UG/DL (ref 110–370)
VIT B12 SERPL-MCNC: 543 PG/ML (ref 211–911)
WBC # BLD AUTO: 4 X10*3/UL (ref 4.4–11.3)

## 2025-05-28 PROCEDURE — 85025 COMPLETE CBC W/AUTO DIFF WBC: CPT

## 2025-05-28 PROCEDURE — 36415 COLL VENOUS BLD VENIPUNCTURE: CPT

## 2025-05-28 PROCEDURE — 83550 IRON BINDING TEST: CPT

## 2025-05-28 PROCEDURE — 83540 ASSAY OF IRON: CPT

## 2025-05-28 PROCEDURE — 86316 IMMUNOASSAY TUMOR OTHER: CPT

## 2025-05-28 PROCEDURE — 80053 COMPREHEN METABOLIC PANEL: CPT

## 2025-05-28 PROCEDURE — 82728 ASSAY OF FERRITIN: CPT

## 2025-05-28 PROCEDURE — 82607 VITAMIN B-12: CPT

## 2025-05-31 DIAGNOSIS — D3A.8 NEUROENDOCRINE TUMOR: ICD-10-CM

## 2025-05-31 DIAGNOSIS — D3A.092 BENIGN CARCINOID TUMOR OF STOMACH: ICD-10-CM

## 2025-05-31 DIAGNOSIS — D51.0 PERNICIOUS ANEMIA: ICD-10-CM

## 2025-05-31 DIAGNOSIS — K29.40 ATROPHIC GASTRITIS WITHOUT HEMORRHAGE: ICD-10-CM

## 2025-05-31 DIAGNOSIS — D50.0 IRON DEFICIENCY ANEMIA DUE TO CHRONIC BLOOD LOSS: ICD-10-CM

## 2025-06-03 ENCOUNTER — OFFICE VISIT (OUTPATIENT)
Dept: HEMATOLOGY/ONCOLOGY | Facility: CLINIC | Age: 65
End: 2025-06-03
Payer: COMMERCIAL

## 2025-06-03 VITALS
OXYGEN SATURATION: 95 % | WEIGHT: 224.32 LBS | TEMPERATURE: 97.7 F | HEART RATE: 78 BPM | RESPIRATION RATE: 16 BRPM | DIASTOLIC BLOOD PRESSURE: 81 MMHG | BODY MASS INDEX: 35.12 KG/M2 | SYSTOLIC BLOOD PRESSURE: 138 MMHG

## 2025-06-03 DIAGNOSIS — K29.40 ATROPHIC GASTRITIS WITHOUT HEMORRHAGE: ICD-10-CM

## 2025-06-03 DIAGNOSIS — D50.0 IRON DEFICIENCY ANEMIA DUE TO CHRONIC BLOOD LOSS: ICD-10-CM

## 2025-06-03 DIAGNOSIS — D51.0 PERNICIOUS ANEMIA: ICD-10-CM

## 2025-06-03 DIAGNOSIS — D3A.8 NEUROENDOCRINE TUMOR: ICD-10-CM

## 2025-06-03 DIAGNOSIS — D3A.092 BENIGN CARCINOID TUMOR OF STOMACH: ICD-10-CM

## 2025-06-03 PROCEDURE — 3079F DIAST BP 80-89 MM HG: CPT | Performed by: INTERNAL MEDICINE

## 2025-06-03 PROCEDURE — 99214 OFFICE O/P EST MOD 30 MIN: CPT | Performed by: INTERNAL MEDICINE

## 2025-06-03 PROCEDURE — 3075F SYST BP GE 130 - 139MM HG: CPT | Performed by: INTERNAL MEDICINE

## 2025-06-03 ASSESSMENT — PAIN SCALES - GENERAL: PAINLEVEL_OUTOF10: 0-NO PAIN

## 2025-06-03 NOTE — PROGRESS NOTES
Patient ID: Yessica Conner is a 64 y.o. female.  Referring Physician: Nathan Hernandez MD  [address] [facility], [place] [address]  Primary Care Provider: Khris Harper MD    ORDERS & PATIENT INSTRUCTIONS:    Patient Instructions:       Net PET for gastric carcinoid, recurrent, in couple of weeks  Consult Dr. Cancino few days after PET scan (patient has seen Dr. Cancino in the past/5 to 6 years ago)    Return for follow-up in 6 weeks        ASSESSMENT, PROBLEM LIST, DECISION MAKING, PLAN.      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  Random gastric biopsy was consistent with atrophic gastritis.  Gastrin level extremely elevated at around 2300 and 24-hour urine for 5 HIAA level normal, chromogranin A is elevated  Patient has a type I gastric carcinoid which is associated with chronic atrophic gastritis, and  related to her pernicious anemia, typically tumor less than 1-2 cm in size can be periodically removed endoscopically especially those are low-grade carcinoid and less likely to metastasize,   Patient was seen by Dr. Oviedo and recommended watchful waiting    She has been on periodic endoscopy and endoscopic removal of gastric polyp, patient has had Pediotic EGD and removal of gastric polyp which was consistent with low-grade neuroendocrine tumors.  Repeat EGD and biopsy of gastric wall is again positive in May 2025        pernicious anemia diagnosed at age 29  Vitamin B12 deficiency, patient is on vitamin B12 injection once a month's/self injection.      Iron deficiency anemia-patient received IV iron in September 2020 with normalization of CBC   Became recurrently iron  deficient in September 2024 and not responding to oral iron         Past medical history  Diabetes mellitus, hypothyroidism, hypertension, hyperlipidemia, GERD, shingles in 2007, anxiety,  Cholecystectomy in August 2024    Interval history    Patient returns today for follow-up on low-grade gastric neuroendocrine tumors,       PHYSICAL EXAM:    Gen:  Conscious,  no acute distress,   HEENT: Normocephalic, no icterus. . No nasal discharge,  Oral mucosa moist  Chest: Bilateral symmetrical, Bilateral AE        CVS: S1S2.   Abdomen: Soft, no guarding or rigidity BS+   Extremities: No C/C   Skin: No petechiae.        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  Random gastric biopsy was consistent with atrophic gastritis.  Gastrin level extremely elevated at around 2300 and 24-hour urine for 5 HIAA level normal, chromogranin A is elevated  Patient has a type I gastric carcinoid which is associated with chronic atrophic gastritis, and  related to her pernicious anemia, typically tumor less than 1-2 cm in size can be periodically removed endoscopically especially those are low-grade carcinoid and less likely to metastasize,   Patient was seen by Dr. Ovideo and recommended watchful waiting    She has been on periodic endoscopy and endoscopic removal of gastric polyp,   Had repeat EGD done in February 2023 and removed additional 14 polyps which were all subcentimeter, and came back positive for low-grade neuroendocrine tumor.    Repeat EGD on March 14, 2024 showed multiple gastric polyps, 6 polyps were resected from the body and the antrum biopsy result was negative for malignancy and H. pylori was negative,   colonoscopy had 1  tubular adenoma in the cecum other 1 was hyperplastic.  Repeat EGD And EUS evaluation with multiple biopsies done last month showed Well-differentiated neuroendocrine carcinoma grade 1 involving gastric body lesser curve, greater curve, anteriorly, posteriorly and gastric cardia all of them are positive for synaptophysin Chromogranin Ki-67 was less than 2% extending at least to the muscularis mucosa and present at the peripheral and deep tissue edge, no mitotic features or lymphovascular invasion was identified.    At this time I will further evaluate her by checking net PET to determine whether there is any evidence of metastatic disease or not and if her disease is limited to stomach only, will check with Dr. Cancino again to see if there would be any role of surgical treatment especially patient has been having rather diffuse involvement and As there is extensive involvement of gastric wall, and rarely there may be a coexisting adenocarcinoma as well.      Discussed with patient and   Records from [facility] Reviewed    Pernicious anemia/Vitamin B12 deficiency, patient is on vitamin B12 injection once a month's/self injection.    Patient has been persistently iron deficient in the past, she received IV iron in 2020, she was on oral iron but did not seem to be absorbing so she received additional IV iron 510 mg Feraheme in October 2024, hemoglobin has improved, ferritin is stable although she has low iron saturation but it is chronic, for now we will monitor  She has discontinued oral iron  Continue watchful waiting              VITALS:   2.2 meters squared /81   Pulse 78   Temp 36.5 °C (97.7 °F)   Resp 16   Wt 102 kg (224 lb 5.1 oz)   SpO2 95%   BMI 35.12 kg/m²     LABS:    CBC:  Recent Labs     05/28/25  0912 01/21/25  0928 10/03/24  1328 09/18/24  0910 03/26/24  1146   WBC 4.0* 5.3 5.2 5.2 4.7   HGB 13.3 14.2 12.7 12.8 13.6   HCT 40.0 44.0 39.4 39.7 41.0    308 306 305 310   MCV  "86 89 84 85 86       CMP:  Recent Labs     05/28/25  0912 01/21/25  0928 09/18/24  0910 03/26/24  1146 09/12/23  1441    140 140 136 139   K 4.2 4.4 4.3 4.1 4.0    102 105 102 102   CO2 26 27 26 22 26   ANIONGAP 15 15 13 16 15   BUN 14 16 15 16 18   CREATININE 0.70 0.72 0.63 0.70 0.70   EGFR >90 >90 >90 >90  --      Recent Labs     05/28/25  0912 01/21/25  0928 09/18/24  0910 03/26/24  1146 09/12/23  1441   ALBUMIN 4.5 4.7 4.4 4.5 4.6   ALKPHOS 40 42 33 37 39   ALT 55* 64* 39 74* 63*   AST 38 45* 28 44* 39   BILITOT 0.4 0.5 0.4 0.4 0.3       HEME/ENDO:  Recent Labs     05/28/25  0912 01/21/25  0928 10/03/24  1328 09/18/24  0910 03/26/24  1146 01/02/24  0921 09/12/23  1441   FERRITIN 41 68 23 18 28   < > 31   IRONSAT  --   --   --   --   --   --  NOT CALC.   [id number]  --   --  270 584  --  312    < > = values in this interval not displayed.        MICRO: No results for input(s): \"ESR\", \"CRP\", \"PROCAL\" in the last 83448 hours.  No results found for the last 90 days.        TUMOR MARKERS:  No results found for: \"LABCA2\", \"CEA\", \"\", \"PSA\", \"AFPTM\", \"HCGTM\", \"\"             IMAGING:         XR cholangiogram intraoperative  Fluoroscopic guidance for intraoperative cholangiogram 8/13/2024 9:17 AM CDT    History:  OTHER REASON    Tech notes: WHAT SYMPTOMS ARE YOU EXPERIENCING?; CHOLELITHIASIS; FLUOROSCOPY TIME (MINUTES); .4M; NUMBER OF FLUORO SPOT IMAGES; 8.00; mGy; 19.70    Findings:    Fluoroscopic guidance was provided in the operating room for intraoperative cholangiogram.  0.4 minutes of fluoroscopy time was provided, 19.70 mGy (cumulative air kerma). 8 images are submitted. Please see the operative report for additional information.    Impression:    Fluoroscopic guidance was provided for intraoperative cholangiogram as discussed above    []    Electronically signed by:  Cuba Nolasco MD  08/13/2024 01:28 PM EDT  Workstation: [id number]  Technologist:  JR MANNY,SANTO  Dictated By:   TRUMAN CEBALLOS, " KENNA  Signed By:     KENNA LAUGHLIN MD    Signed Out:    08/13/24 13:28:41       Current Outpatient Medications   Medication Sig Dispense Refill    amLODIPine (Norvasc) 2.5 mg tablet       aspirin 81 mg EC tablet Take by mouth.      atorvastatin (Lipitor) 10 mg tablet       atorvastatin calcium (ATORVASTATIN ORAL) Take 10 mg by mouth.      cholecalciferol (Vitamin D-3) 50 MCG (2000 UT) tablet Take by mouth.      cyanocobalamin (Vitamin B-12) 1,000 mcg/mL injection       fenofibrate micronized (Lofibra) 134 mg capsule       fluconazole (Diflucan) 200 mg tablet 1 TABLET ORALLY EVERY OTHER DAY FOR 3 DOSES 7 DAYS      fluticasone (Flonase) 50 mcg/actuation nasal spray       glipiZIDE (Glucotrol) 10 mg tablet       glipiZIDE (Glucotrol) 5 mg tablet Take 1 tablet (5 mg) by mouth 2 times a day.      hydrocortisone-acetic acid (Vosol-HC) otic solution       hyoscyamine (Anaspaz, Levsin) 0.125 mg tablet Take 1 tablet (0.125 mg) by mouth every 8 hours if needed.      levothyroxine (Synthroid, Levoxyl) 50 mcg tablet       levothyroxine (Tirosint) 50 mcg capsule 1 capsule (50 mcg).      losartan-hydrochlorothiazide (Hyzaar) 100-25 mg tablet       metFORMIN (Glucophage) 1,000 mg tablet Take 1 tablet (1,000 mg) by mouth 2 times daily (morning and late afternoon).      metFORMIN (Glucophage) 500 mg tablet Take by mouth.      metformin HCl (METFORMIN ORAL) 1,000 mg.      nitrofurantoin, macrocrystal-monohydrate, (Macrobid) 100 mg capsule TAKE 1 CAPSULE AFTER SEX AND REPEAT 1 CAPSULE IN 12 HOURS ORALLY EVERY 90 DAYS      ondansetron ODT (Zofran-ODT) 4 mg disintegrating tablet TAKE 1 TABLET BY MOUTH EVERY 6 HOURS AS NEEDED FOR NAUSEA AND VOMITING FOR UP TO 7 DAYS      pioglitazone (Actos) 15 mg tablet Take 1 tablet (15 mg) by mouth once daily.      simvastatin (Zocor) 5 mg tablet Take 1 tablet (5 mg) by mouth once daily.      SITagliptin phosphate (Januvia) 100 mg tablet Take 1 tablet (100 mg) by mouth once daily.       No current  facility-administered medications for this visit.                  SOCIAL HISTORY:    reports current alcohol use of about 2.0 standard drinks of alcohol per week.   reports no history of drug use.,   Tobacco Use: Medium Risk (5/7/2025)    Received from [facility]    Patient History     Smoking Tobacco Use: Former     Smokeless Tobacco Use: Never     Passive Exposure: Not on file       FAMILY HISTORY:  Family History   Problem Relation Name Age of Onset    Diabetes Mother Monique Fry     Hypertension Father Branden Fry        ALLERGY:   Sulfamethoxazole-trimethoprim, Canagliflozin, and Sulfa (sulfonamide antibiotics)              Medication reviewed in e-chart  Patient is monitored for medication toxicity  labs reviewed and interpreted independently, X rays independently reviewed  Notes from other physicians involved in care were reviewed    Charting was completed using voice recognition technology and may include unintended errors.    BRANDT GARY MD, PAKO.    Jose David Quick Master Clinician in Hematology and Oncology  Medical Director, [facility] at [facility].  [place] office  Phone [phone]  Fax      [phone]  [facility] /[place].  Phone [phone]  Fax     [phone]

## 2025-06-03 NOTE — PROGRESS NOTES
Net PET for gastric carcinoid, recurrent, in couple of weeks  Consult Dr. Cancino few days after PET scan (patient has seen Dr. Cancino in the past/5 to 6 years ago)   Return for follow-up in 6 weeks    Patient for NET PET and then to see DR. Cancino for eval.  Has seen dr. Cancino in the past several years ago.  Referral sent to Dr. Rivera office.   Follow up in 6 weeks.  Patient aware to have Dr. Hernandez FUV to be after PET and Dr. Cancino appt.  If not, please call to reschedule.  Patient and  verbalized understanding.  No further needs.  Patient independently ambulatory off unit in NAD and without complaints.  Gait steady.  Call back instructions reviewed.  Patient verbalized understanding.

## 2025-06-04 ENCOUNTER — TELEPHONE (OUTPATIENT)
Dept: HEMATOLOGY/ONCOLOGY | Facility: CLINIC | Age: 65
End: 2025-06-04
Payer: COMMERCIAL

## 2025-06-04 LAB — CHROMOGRANIN A, LC/MS/MS: 873 NG/ML

## 2025-06-04 NOTE — TELEPHONE ENCOUNTER
RN called pt back. Pt was questioning her recently scheduled appointment with Surgical Oncology with Dr. Covarrubias. She reported that per the conversation she had with Dr. Hernandez yesterday, that he was recommending that she schedule a visit with Dr. Cancino who she has seen back in 2019. RN looked up Dr. Cancino's scheduling number to give to the pt and explained that the referral that was placed was for [facility] surgical oncology so the  did not know to specifically look for Dr. Cancino. Pt is going to call to cancel her appointment with Dr. Covarrubias and reschedule with Dr. Cancino. Pt appreciative of the call back and has no further questions or concerns at this time.

## 2025-06-04 NOTE — TELEPHONE ENCOUNTER
Reason for Conversation  questions regarding referal    Background   Patient called was going to be referred to her prior surgeon that she saw before but she has questions because she noticed she was scheduled with someone different and it a [place] location. Please call her @ [phone]    Disposition   No disposition on file.

## 2025-06-23 ENCOUNTER — HOSPITAL ENCOUNTER (OUTPATIENT)
Dept: RADIOLOGY | Facility: HOSPITAL | Age: 65
Discharge: HOME | End: 2025-06-23
Payer: COMMERCIAL

## 2025-06-23 DIAGNOSIS — K29.40 ATROPHIC GASTRITIS WITHOUT HEMORRHAGE: ICD-10-CM

## 2025-06-23 DIAGNOSIS — D3A.092 BENIGN CARCINOID TUMOR OF STOMACH: ICD-10-CM

## 2025-06-23 DIAGNOSIS — D51.0 PERNICIOUS ANEMIA: ICD-10-CM

## 2025-06-23 DIAGNOSIS — D50.0 IRON DEFICIENCY ANEMIA DUE TO CHRONIC BLOOD LOSS: ICD-10-CM

## 2025-06-23 DIAGNOSIS — D3A.8 NEUROENDOCRINE TUMOR: ICD-10-CM

## 2025-06-23 PROCEDURE — A9587 GALLIUM GA-68: HCPCS | Mod: JZ | Performed by: INTERNAL MEDICINE

## 2025-06-23 PROCEDURE — 3430000001 HC RX 343 DIAGNOSTIC RADIOPHARMACEUTICALS: Mod: JZ | Performed by: INTERNAL MEDICINE

## 2025-06-23 PROCEDURE — 78815 PET IMAGE W/CT SKULL-THIGH: CPT | Mod: PET TUMOR INIT TX STRAT | Performed by: RADIOLOGY

## 2025-06-23 PROCEDURE — 78815 PET IMAGE W/CT SKULL-THIGH: CPT | Mod: PI

## 2025-06-23 RX ADMIN — 68GA-DOTATATE 5.4 MILLICURIE: KIT INTRAVENOUS at 11:08

## 2025-06-30 ENCOUNTER — APPOINTMENT (OUTPATIENT)
Dept: SURGICAL ONCOLOGY | Facility: CLINIC | Age: 65
End: 2025-06-30
Payer: COMMERCIAL

## 2025-07-09 ENCOUNTER — TELEPHONE (OUTPATIENT)
Dept: HEMATOLOGY/ONCOLOGY | Facility: CLINIC | Age: 65
End: 2025-07-09

## 2025-07-09 ENCOUNTER — APPOINTMENT (OUTPATIENT)
Dept: SURGICAL ONCOLOGY | Facility: HOSPITAL | Age: 65
End: 2025-07-09
Payer: COMMERCIAL

## 2025-07-09 ENCOUNTER — LAB (OUTPATIENT)
Dept: LAB | Facility: HOSPITAL | Age: 65
End: 2025-07-09
Payer: COMMERCIAL

## 2025-07-09 VITALS
TEMPERATURE: 96.8 F | OXYGEN SATURATION: 95 % | DIASTOLIC BLOOD PRESSURE: 74 MMHG | HEART RATE: 74 BPM | WEIGHT: 223.33 LBS | SYSTOLIC BLOOD PRESSURE: 134 MMHG | RESPIRATION RATE: 16 BRPM | BODY MASS INDEX: 34.97 KG/M2

## 2025-07-09 DIAGNOSIS — D3A.8 NEUROENDOCRINE TUMOR: ICD-10-CM

## 2025-07-09 PROCEDURE — 3075F SYST BP GE 130 - 139MM HG: CPT | Performed by: SURGERY

## 2025-07-09 PROCEDURE — 99205 OFFICE O/P NEW HI 60 MIN: CPT | Performed by: SURGERY

## 2025-07-09 PROCEDURE — 82941 ASSAY OF GASTRIN: CPT

## 2025-07-09 PROCEDURE — 86255 FLUORESCENT ANTIBODY SCREEN: CPT

## 2025-07-09 PROCEDURE — 36415 COLL VENOUS BLD VENIPUNCTURE: CPT

## 2025-07-09 PROCEDURE — 86340 INTRINSIC FACTOR ANTIBODY: CPT

## 2025-07-09 PROCEDURE — 99215 OFFICE O/P EST HI 40 MIN: CPT | Mod: 25 | Performed by: SURGERY

## 2025-07-09 PROCEDURE — 3078F DIAST BP <80 MM HG: CPT | Performed by: SURGERY

## 2025-07-09 ASSESSMENT — ENCOUNTER SYMPTOMS
WEAKNESS: 0
ABDOMINAL PAIN: 0
SHORTNESS OF BREATH: 0
VOMITING: 0
CONSTITUTIONAL NEGATIVE: 1
DYSURIA: 0
NAUSEA: 0
SPEECH DIFFICULTY: 0
HEADACHES: 0
HALLUCINATIONS: 0
ENDOCRINE NEGATIVE: 1
EYE DISCHARGE: 0
FLANK PAIN: 0
CONFUSION: 0
SORE THROAT: 0
ADENOPATHY: 0

## 2025-07-09 ASSESSMENT — PAIN SCALES - GENERAL: PAINLEVEL_OUTOF10: 0-NO PAIN

## 2025-07-09 NOTE — TELEPHONE ENCOUNTER
Chart reviewed by RN.  Patient scheduled with Dr Cancino 7/9 and follow-up with Dr Hernandez 7/22.

## 2025-07-09 NOTE — PROGRESS NOTES
Subjective     HPI  Yessica Conner is a 64 y.o. female who is is here for type 1 gastric carcinoid.  I had seen her in 2019 for this problem as well.  She has a history of atrophic gastritis with pernicious anemia since her late 20s.  She has been undergoing endoscopic surveillance and resections for the well-differentiated type I gastric carcinoid tumors.  She underwent endoscopy in April 2025 with multiple biopsies showing well-differentiated grade 1 gastric carcinoids in the stomach.  PET Netspot showed isolated disease to the stomach.  She has had a history of elevated gastrin into the 2000's which is consistent with type I gastric carcinoid.  Vitamin B12 level is normal as she gets B12 injections.  In the records, I do not see antiparietal cell antibody or intrinsic factor blocking antibody results.  Patient feels fairly well overall.  She uses Protonix daily.    She is here today with her     Review of Systems   Constitutional: Negative.    HENT:  Negative for ear discharge, nosebleeds and sore throat.    Eyes:  Negative for discharge.   Respiratory:  Negative for shortness of breath.    Cardiovascular:  Negative for chest pain.   Gastrointestinal:  Negative for abdominal pain, nausea and vomiting.   Endocrine: Negative.    Genitourinary:  Negative for dysuria and flank pain.   Musculoskeletal:  Negative for gait problem.   Skin:  Negative for rash.   Neurological:  Negative for speech difficulty, weakness and headaches.   Hematological:  Negative for adenopathy.   Psychiatric/Behavioral:  Negative for behavioral problems, confusion and hallucinations.         Medical History[1]   Surgical History[2]   Medications Ordered Prior to Encounter[3]   RX Allergies[4]   Social History     Socioeconomic History    Marital status:      Spouse name: Not on file    Number of children: Not on file    Years of education: Not on file    Highest education level: Not on file   Occupational History    Not on file    Tobacco Use    Smoking status: Former     Current packs/day: 0.00     Average packs/day: 1 pack/day for 15.0 years (15.0 ttl pk-yrs)     Types: Cigarettes     Start date: 1994     Quit date: 2009     Years since quittin.4    Smokeless tobacco: Not on file    Tobacco comments:     quit smoking in    Substance and Sexual Activity    Alcohol use: Yes     Alcohol/week: 2.0 standard drinks of alcohol     Types: 1 Glasses of wine, 1 Cans of beer per week     Comment: drink occasionally on weekend or out to dinner    Drug use: Never    Sexual activity: Yes     Partners: Male     Birth control/protection: Post-menopausal     Comment: menopause   Other Topics Concern    Not on file   Social History Narrative    Not on file     Social Drivers of Health     Financial Resource Strain: Not on file   Food Insecurity: Not on file   Transportation Needs: Not on file   Physical Activity: Not on file   Stress: Not on file   Social Connections: Not on file   Intimate Partner Violence: Not on file   Housing Stability: Not on file      Family History[5]       Objective     Vitals:    25 1135   BP: 134/74   Pulse: 74   Resp: 16   Temp: 36 °C (96.8 °F)   SpO2: 95%          Physical Exam     Lab Results   Component Value Date    WBC 4.0 (L) 2025    HGB 13.3 2025    HCT 40.0 2025     2025     Lab Results   Component Value Date     2025    K 4.2 2025     2025    CO2 26 2025    BUN 14 2025    CREATININE 0.70 2025    EGFR >90 2025    CALCIUM 10.1 2025    ALBUMIN 4.5 2025    PROT 6.7 2025    AST 38 2025    ALT 55 (H) 2025    BILITOT 0.4 2025            PATHOLOGIST:   [id number]             2025 08:47 EDT      2025 11:05 EDT       RICCO CEBALLOS, BHANU LINCOLN   Final Diagnosis   Report Type:  C-1100   A. GASTRIC ANTRUM, COLD   BIOPSY:   -ANTRAL TYPE GASTRIC MUCOSA WITH CHRONIC INACTIVE GASTRITIS  AND REACTIVE GASTROPATHY.   -H. PYLORI IMMUNOSTAIN IS NEGATIVE.   B. GASTRIC BODY LESSER CURVE, COLD   BIOPSY   -  WELL-DIFFERENTIATED NEUROENDOCRINE CARCINOMA , GRADE 1, SEE COMMENT.   - INTESTINAL METAPLASIA, COMPLETE TYPE.   C. GASTRIC BODY GREATER CURVE, COLD   BIOPSY   -  WELL-DIFFERENTIATED NEUROENDOCRINE CARCINOMA, GRADE 1, SEE COMMENT.   - INTESTINAL METAPLASIA, COMPLETE TYPE.   D. GASTRIC BODY ANTERIOR, COLD   BIOPSY   -  WELL-DIFFERENTIATED NEUROENDOCRINE CARCINOMA , GRADE 1, SEE COMMENT.   - INTESTINAL METAPLASIA, COMPLETE TYPE.   E. GASTRIC BODY POSTERIOR, COLD   BIOPSY   -  WELL-DIFFERENTIATED NEUROENDOCRINE CARCINOMA, GRADE 1, SEE COMMENT.   - INTESTINAL METAPLASIA, COMPLETE TYPE.   F. GASTRIC CARDIA, COLD   BIOPSY   -  WELL-DIFFERENTIATED NEUROENDOCRINE CARCINOMA, GRADE 1, SEE COMMENT.   - INTESTINAL METAPLASIA, COMPLETE TYPE.   Comment: Tumor is positive for synaptophysin, chromogranin and Ki67 proliferation index is <  2%.  The neoplasm  extends at least to the muscularis mucosae and is present at the peripheral     6/23/25 PET CT NETSPOT  IMPRESSION:  1. Increased somatostatin receptor expression in the posterior  gastric body wall. Finding would be compatible with patient's known  gastric neuroendocrine tumor. Localizing image was sent to PACS.  2. No additional abnormal somatostatin activity visualized elsewhere.          Assessment/Plan     64-year-old woman with type I gastric carcinoid.  I have ordered some labs including gastrin, antiparietal cell antibody and intrinsic factor antibody.  Her history is very consistent with type I gastric carcinoid associated with pernicious anemia and atrophic gastritis.  This tends to be isolated to the stomach and exceedingly rarely metastasizes.  Endoscopic surveillance and resection every 6 to 12 months is recommended.  More aggressive procedures such as gastrectomy would not be recommended.  I have reached out to Dr. Villarreal with this  recommendation.    Gagan Cancino MD           [1]   Past Medical History:  Diagnosis Date    Anemia 1988    Diabetes mellitus (Multi)     Stomach cancer (Multi) Nov 2018   [2]   Past Surgical History:  Procedure Laterality Date    CHOLECYSTECTOMY  2024    ESOPHAGOGASTRODUODENOSCOPY  Oct 2018 & yearly   [3]   Current Outpatient Medications on File Prior to Visit   Medication Sig Dispense Refill    amLODIPine (Norvasc) 2.5 mg tablet       aspirin 81 mg EC tablet Take by mouth.      atorvastatin (Lipitor) 10 mg tablet       cholecalciferol (Vitamin D-3) 50 MCG (2000 UT) tablet Take by mouth.      cyanocobalamin (Vitamin B-12) 1,000 mcg/mL injection       fenofibrate micronized (Lofibra) 134 mg capsule       fluticasone (Flonase) 50 mcg/actuation nasal spray       glipiZIDE (Glucotrol) 10 mg tablet       levothyroxine (Synthroid, Levoxyl) 50 mcg tablet       losartan-hydrochlorothiazide (Hyzaar) 100-25 mg tablet       metFORMIN (Glucophage) 1,000 mg tablet Take 1 tablet (1,000 mg) by mouth 2 times daily (morning and late afternoon).      nitrofurantoin, macrocrystal-monohydrate, (Macrobid) 100 mg capsule TAKE 1 CAPSULE AFTER SEX AND REPEAT 1 CAPSULE IN 12 HOURS ORALLY EVERY 90 DAYS      pioglitazone (Actos) 15 mg tablet Take 1 tablet (15 mg) by mouth once daily.       No current facility-administered medications on file prior to visit.   [4]   Allergies  Allergen Reactions    Sulfamethoxazole-Trimethoprim Swelling    Canagliflozin Other    Sulfa (Sulfonamide Antibiotics) Other and Swelling   [5]   Family History  Problem Relation Name Age of Onset    Diabetes Mother Monique Fry     Hypertension Father Branden Fry

## 2025-07-09 NOTE — TELEPHONE ENCOUNTER
----- Message from Nathan Hernandez sent at 7/8/2025  5:21 PM EDT -----  Simón, please ask patient to see Dr. Cancino, who has seen patient in the past for abnormal net PET  ----- Message -----  From: Interface, Radiology Results In  Sent: 6/23/2025   3:53 PM EDT  To: Nathan Hernandez MD

## 2025-07-10 LAB
HOLD SPECIMEN: NORMAL
HOLD SPECIMEN: NORMAL

## 2025-07-11 LAB — IF BLOCK AB SER QL RIA: POSITIVE

## 2025-07-12 LAB — GASTRIN SERPL-MCNC: 1571 PG/ML (ref 0–100)

## 2025-07-13 LAB — PCA IGG SER-ACNC: 7.1 UNITS (ref 0–24.9)

## 2025-07-22 ENCOUNTER — OFFICE VISIT (OUTPATIENT)
Dept: HEMATOLOGY/ONCOLOGY | Facility: CLINIC | Age: 65
End: 2025-07-22
Payer: COMMERCIAL

## 2025-07-22 VITALS
HEART RATE: 68 BPM | DIASTOLIC BLOOD PRESSURE: 81 MMHG | RESPIRATION RATE: 16 BRPM | HEIGHT: 66 IN | WEIGHT: 225.31 LBS | SYSTOLIC BLOOD PRESSURE: 128 MMHG | OXYGEN SATURATION: 94 % | TEMPERATURE: 97.3 F | BODY MASS INDEX: 36.21 KG/M2

## 2025-07-22 DIAGNOSIS — D3A.8 NEUROENDOCRINE TUMOR: ICD-10-CM

## 2025-07-22 DIAGNOSIS — D50.0 IRON DEFICIENCY ANEMIA DUE TO CHRONIC BLOOD LOSS: ICD-10-CM

## 2025-07-22 DIAGNOSIS — K29.40 ATROPHIC GASTRITIS WITHOUT HEMORRHAGE: ICD-10-CM

## 2025-07-22 DIAGNOSIS — D3A.092 BENIGN CARCINOID TUMOR OF STOMACH: ICD-10-CM

## 2025-07-22 DIAGNOSIS — D51.0 PERNICIOUS ANEMIA: ICD-10-CM

## 2025-07-22 PROCEDURE — 99214 OFFICE O/P EST MOD 30 MIN: CPT | Performed by: INTERNAL MEDICINE

## 2025-07-22 PROCEDURE — 3008F BODY MASS INDEX DOCD: CPT | Performed by: INTERNAL MEDICINE

## 2025-07-22 PROCEDURE — 3079F DIAST BP 80-89 MM HG: CPT | Performed by: INTERNAL MEDICINE

## 2025-07-22 PROCEDURE — 3074F SYST BP LT 130 MM HG: CPT | Performed by: INTERNAL MEDICINE

## 2025-07-22 ASSESSMENT — PAIN SCALES - GENERAL: PAINLEVEL_OUTOF10: 0-NO PAIN

## 2025-07-22 NOTE — PROGRESS NOTES
Patient ID: Yessica Conner is a 64 y.o. female.  Referring Physician: Nathan Hernandez MD  [address] [facility], [place] [address]  Primary Care Provider: Khris Harper MD    ORDERS & PATIENT INSTRUCTIONS:    Patient Instructions:     Return for follow-up in 3 months  CBC, iron group, ferritin, B12        ASSESSMENT, PROBLEM LIST, DECISION MAKING, PLAN.      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  Random gastric biopsy was consistent with atrophic gastritis.  Gastrin level extremely elevated at around 2300 and 24-hour urine for 5 HIAA level normal, chromogranin A is elevated  Patient has a type I gastric carcinoid which is associated with chronic atrophic gastritis, and  related to her pernicious anemia, typically tumor less than 1-2 cm in size can be periodically removed endoscopically especially those are low-grade carcinoid and less likely to metastasize,   Patient was seen by Dr. Oviedo and recommended watchful waiting    She has been on periodic endoscopy and endoscopic removal of gastric polyp, patient has had Pediotic EGD and removal of gastric polyp which was consistent with low-grade neuroendocrine tumors.  Repeat EGD and biopsy of gastric wall is again positive in May 2025        pernicious anemia diagnosed at age 29  Vitamin B12 deficiency, patient is on vitamin B12 injection once a month's/self injection.      Iron deficiency anemia-patient received IV iron in September 2020 with normalization of CBC   Became recurrently iron deficient in September 2024 and not responding to oral iron         Past medical history  Diabetes mellitus, hypothyroidism, hypertension,  hyperlipidemia, GERD, shingles in 2007, anxiety,  Cholecystectomy in August 2024    Interval history    Patient returns today for follow-up on low-grade gastric neuroendocrine tumors,   Patient was seen by Dr. Cancino on July 9, 2025, and he recommended against surgery and recommended watchful waiting with repeating EGD every 6-12 months  Patient's intrinsic factor antibody was positive and gastrin level was 1571, antiparietal cell antibody was normal    PHYSICAL EXAM:    Gen:  Conscious,  no acute distress,   HEENT: Normocephalic, no icterus. . No nasal discharge,  Oral mucosa moist  Chest: Bilateral symmetrical, Bilateral AE        CVS: S1S2.   Abdomen: Soft, no guarding or rigidity BS+   Extremities: No C/C   Skin: No petechiae.        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  Random gastric biopsy was consistent with atrophic gastritis.  Gastrin level extremely elevated at around 2300 and 24-hour urine for 5 HIAA level normal, chromogranin A is elevated  Patient has a type I gastric carcinoid which is associated with chronic atrophic gastritis, and  related to her pernicious anemia, typically tumor less than 1-2 cm in size can be periodically removed endoscopically especially those are low-grade carcinoid and less likely to metastasize,           Patient was seen by Dr. Oviedo and recommended watchful waiting    She has been on periodic endoscopy and endoscopic removal of gastric polyp,   Had repeat EGD done in February 2023 and removed additional 14 polyps which were all subcentimeter, and came back positive for low-grade neuroendocrine tumor.    Repeat EGD on March 14, 2024 showed multiple gastric polyps, 6  "polyps were resected from the body and the antrum biopsy result was negative for malignancy and H. pylori was negative,   colonoscopy had 1 tubular adenoma in the cecum other 1 was hyperplastic.      Repeat EGD And EUS evaluation with multiple biopsies done on May 13, 2025 showed Well-differentiated neuroendocrine carcinoma grade 1 involving gastric body lesser curve, greater curve, anteriorly, posteriorly and gastric cardia all of them are positive for synaptophysin Chromogranin Ki-67 was less than 2% extending at least to the muscularis mucosa and present at the peripheral and deep tissue edge, no mitotic features or lymphovascular invasion was identified.  Patient had neck PET done on 23 June 2025 which showed increased somatostatin receptor expression in the posterior gastric body compatible with gastric neuroendocrine tumor.  There was no other abnormal activity noted    Patient was seen by Dr. Cancino on July 9, 2025, and he recommended against surgery and recommended watchful waiting with repeating EGD every 6-12 months  Patient's intrinsic factor antibody was positive and gastrin level was 1571, antiparietal cell antibody was normal    Patient was advised to talk to Dr. Villarreal and make sure to have a follow-up EGD in November 2025      Pernicious anemia/Vitamin B12 deficiency, patient is on vitamin B12 injection once a month's/self injection.    Patient has been persistently iron deficient in the past, she received IV iron in 2020, she was on oral iron but did not seem to be absorbing so she received additional IV iron 510 mg Feraheme in October 2024,   Ferritin was 41 in May 2025, hemoglobin was 13.3, will monitor              VITALS:   2.18 meters squared /81   Pulse 68   Temp 36.3 °C (97.3 °F)   Resp 16   Ht (S) 1.674 m (5' 5.91\")   Wt 102 kg (225 lb 5 oz)   SpO2 94%   BMI 36.47 kg/m²     LABS:    CBC:  Recent Labs     05/28/25  0912 01/21/25  0928 10/03/24  1328 09/18/24  0910 03/26/24  1146 " "  WBC 4.0* 5.3 5.2 5.2 4.7   HGB 13.3 14.2 12.7 12.8 13.6   HCT 40.0 44.0 39.4 39.7 41.0    308 306 305 310   MCV 86 89 84 85 86       CMP:  Recent Labs     05/28/25  0912 01/21/25  0928 09/18/24  0910 03/26/24  1146 09/12/23  1441    140 140 136 139   K 4.2 4.4 4.3 4.1 4.0    102 105 102 102   CO2 26 27 26 22 26   ANIONGAP 15 15 13 16 15   BUN 14 16 15 16 18   CREATININE 0.70 0.72 0.63 0.70 0.70   EGFR >90 >90 >90 >90  --      Recent Labs     05/28/25  0912 01/21/25  0928 09/18/24  0910 03/26/24  1146 09/12/23  1441   ALBUMIN 4.5 4.7 4.4 4.5 4.6   ALKPHOS 40 42 33 37 39   ALT 55* 64* 39 74* 63*   AST 38 45* 28 44* 39   BILITOT 0.4 0.5 0.4 0.4 0.3       HEME/ENDO:  Recent Labs     05/28/25  0912 01/21/25  0928 10/03/24  1328 09/18/24  0910 03/26/24  1146 01/02/24  0921 09/12/23  1441   FERRITIN 41 68 23 18 28   < > 31   IRONSAT  --   --   --   --   --   --  NOT CALC.   HAAGLHBW90 543  --   --  270 584  --  312    < > = values in this interval not displayed.        MICRO: No results for input(s): \"ESR\", \"CRP\", \"PROCAL\" in the last 89865 hours.  No results found for the last 90 days.        TUMOR MARKERS:  No results found for: \"LABCA2\", \"CEA\", \"\", \"PSA\", \"AFPTM\", \"HCGTM\", \"\"             IMAGING:         NM PET CT net netspot  Narrative: Interpreted By:  Cuba Luciano,  and Andrew Ward   STUDY:  NM PET CT NET NETSPOT;  6/23/2025 12:50 pm      INDICATION:  Signs/Symptoms:Recurrent early-stage gastric carcinoid, recent  gastric biopsy showed multiple positive biopsies, for staging.  ,K29.40 Chronic atrophic gastritis without bleeding,D3A.092 Benign  carcinoid tumor of the stomach,D51.0 Vitamin B12 deficiency anemia  due to intrinsic factor deficiency,D3A.8 Other benign neuroendocrine  tumors,D50.0 Iron deficiency anemia secondary to blood loss (chronic)      60-year-old female with multiple gastric neuroendocrine tumor,  low-grade, type 1. Repeat EGD and biopsy of gastric wall is " again  positive in May 2025.      COMPARISON:  None.      ACCESSION NUMBER(S):  [id number]      ORDERING CLINICIAN:  BRANDT GARY      TECHNIQUE:  DIVISION OF NUCLEAR MEDICINE  POSITRON EMISSION TOMOGRAPHY (PET-CT)      The patient received an intravenous dose of 5.4 mCi of Ga-68  DOTATATE. Positron emission tomographic (PET) images from top of the  skull to mid-thigh were then acquired after a one hour delay. Also  acquired was a contemporaneous low dose non-contrast CT scan  performed for attenuation correction of PET images and anatomic  localization.  The PET and CT images were digitally fused for  display.  All images were acquired on a combined PET-CT scanner unit.  Some areas of FDG accumulation may be described in standardized  uptake value (SUV) units.      CODING:  Initial Treatment Strategy          CALIBRATION:  Dose Injection-to-Scan Interval (mins): 55 min  Blood pool: 2.1  Liver SUV: 8.5  Spleen SUV: 28.7      FINDINGS:  NECK:  Note is made of normal somatostatin avidity in the pituitary gland  and thyroid gland. No evidence of abnormal somatostatin avidity.      CHEST:  No evidence of abnormal somatostatin avidity is seen in the lungs.  No abnormal lymph nodes with somatostatin avidity seen within  axillary, hilar or mediastinal lymph nodes.      ABDOMEN AND PELVIS:  There is a focus of abnormal somatostatin avidity in the posterior  gastric body wall (SUV max 7.8). No additional abnormal somatostatin  avidity is seen in the abdomen and pelvis. There are no lymph nodes  with abnormal somatostatin avidity. Physiologic somatostatin avidity  is seen in the liver, spleen, kidneys, adrenals and bowel.      MUSCULOSKELETAL:  No abnormal somatostatin avidity is identified.      Impression: 1. Increased somatostatin receptor expression in the posterior  gastric body wall. Finding would be compatible with patient's known  gastric neuroendocrine tumor. Localizing image was sent to PACS.  2. No additional  abnormal somatostatin activity visualized elsewhere.          Krenning score: 2  *0: No avidity (less than blood pool)  *1: Very low avidity (equivalent to blood pool)  *2: Avidity less than or equal to the liver but more than blood pool  *3: Avidity higher than liver but less than the spleen  *4: Avidity equal to or higher than the spleen          I personally reviewed the images/study and I agree with radiology  resident Dr. Sylvia Morales's findings as stated. This study was  interpreted at [facility],  [place], [place].      MACRO:  None          Signed by: Cuba Luciano 6/23/2025 3:52 PM  Dictation workstation:   [id number]       Current Outpatient Medications   Medication Sig Dispense Refill    amLODIPine (Norvasc) 2.5 mg tablet       aspirin 81 mg EC tablet Take by mouth.      atorvastatin (Lipitor) 10 mg tablet       cholecalciferol (Vitamin D-3) 50 MCG (2000 UT) tablet Take by mouth.      cyanocobalamin (Vitamin B-12) 1,000 mcg/mL injection       fenofibrate micronized (Lofibra) 134 mg capsule       fluticasone (Flonase) 50 mcg/actuation nasal spray       glipiZIDE (Glucotrol) 10 mg tablet       levothyroxine (Synthroid, Levoxyl) 50 mcg tablet       losartan-hydrochlorothiazide (Hyzaar) 100-25 mg tablet       metFORMIN (Glucophage) 1,000 mg tablet Take 1 tablet (1,000 mg) by mouth 2 times daily (morning and late afternoon).      nitrofurantoin, macrocrystal-monohydrate, (Macrobid) 100 mg capsule TAKE 1 CAPSULE AFTER SEX AND REPEAT 1 CAPSULE IN 12 HOURS ORALLY EVERY 90 DAYS      pioglitazone (Actos) 15 mg tablet Take 1 tablet (15 mg) by mouth once daily.       No current facility-administered medications for this visit.                  SOCIAL HISTORY:    reports current alcohol use of about 2.0 standard drinks of alcohol per week.   reports no history of drug use.,   Tobacco Use: Medium Risk (5/7/2025)    Received from [facility]    Patient History     Smoking Tobacco  Use: Former     Smokeless Tobacco Use: Never     Passive Exposure: Not on file       FAMILY HISTORY:  Family History   Problem Relation Name Age of Onset    Diabetes Mother Monique Fry     Hypertension Father Branden Fry        ALLERGY:   Sulfamethoxazole-trimethoprim, Canagliflozin, and Sulfa (sulfonamide antibiotics)              Medication reviewed in e-chart  Patient is monitored for medication toxicity  labs reviewed and interpreted independently, X rays independently reviewed  Notes from other physicians involved in care were reviewed    Charting was completed using voice recognition technology and may include unintended errors.    BRANDT GARY MD, PAKO.    [facility] Master Clinician in Hematology and Oncology  Medical Director, [facility] at [facility].  [place] office  Phone [phone]  Fax      [phone]  [facility] /[place].  Phone [phone]  Fax     [phone]